# Patient Record
Sex: MALE | Race: WHITE | NOT HISPANIC OR LATINO | Employment: OTHER | URBAN - METROPOLITAN AREA
[De-identification: names, ages, dates, MRNs, and addresses within clinical notes are randomized per-mention and may not be internally consistent; named-entity substitution may affect disease eponyms.]

---

## 2017-10-19 ENCOUNTER — ALLIED HEALTH/NURSE VISIT (OUTPATIENT)
Dept: NURSING | Facility: CLINIC | Age: 67
End: 2017-10-19
Payer: COMMERCIAL

## 2017-10-19 DIAGNOSIS — Z23 NEED FOR TDAP VACCINATION: ICD-10-CM

## 2017-10-19 DIAGNOSIS — Z23 NEED FOR PROPHYLACTIC VACCINATION AND INOCULATION AGAINST INFLUENZA: Primary | ICD-10-CM

## 2017-10-19 PROCEDURE — 90715 TDAP VACCINE 7 YRS/> IM: CPT

## 2017-10-19 PROCEDURE — 90662 IIV NO PRSV INCREASED AG IM: CPT

## 2017-10-19 PROCEDURE — 90472 IMMUNIZATION ADMIN EACH ADD: CPT

## 2017-10-19 PROCEDURE — G0008 ADMIN INFLUENZA VIRUS VAC: HCPCS

## 2017-10-19 NOTE — PROGRESS NOTES

## 2017-10-20 ENCOUNTER — TRANSFERRED RECORDS (OUTPATIENT)
Dept: HEALTH INFORMATION MANAGEMENT | Facility: CLINIC | Age: 67
End: 2017-10-20

## 2018-03-21 ENCOUNTER — OFFICE VISIT (OUTPATIENT)
Dept: FAMILY MEDICINE | Facility: CLINIC | Age: 68
End: 2018-03-21
Payer: COMMERCIAL

## 2018-03-21 VITALS
TEMPERATURE: 98 F | HEART RATE: 87 BPM | WEIGHT: 184.9 LBS | OXYGEN SATURATION: 99 % | SYSTOLIC BLOOD PRESSURE: 122 MMHG | BODY MASS INDEX: 23.73 KG/M2 | DIASTOLIC BLOOD PRESSURE: 74 MMHG | HEIGHT: 74 IN

## 2018-03-21 DIAGNOSIS — Z00.00 MEDICARE ANNUAL WELLNESS VISIT, SUBSEQUENT: Primary | ICD-10-CM

## 2018-03-21 DIAGNOSIS — R47.89 WORD FINDING DIFFICULTY: ICD-10-CM

## 2018-03-21 DIAGNOSIS — G47.00 INSOMNIA, UNSPECIFIED TYPE: ICD-10-CM

## 2018-03-21 DIAGNOSIS — I73.9 PAD (PERIPHERAL ARTERY DISEASE) (H): ICD-10-CM

## 2018-03-21 DIAGNOSIS — L50.9 URTICARIA: ICD-10-CM

## 2018-03-21 DIAGNOSIS — Z87.442 HISTORY OF NEPHROLITHIASIS: ICD-10-CM

## 2018-03-21 DIAGNOSIS — I25.10 CORONARY ARTERY DISEASE INVOLVING NATIVE CORONARY ARTERY OF NATIVE HEART WITHOUT ANGINA PECTORIS: ICD-10-CM

## 2018-03-21 DIAGNOSIS — F17.200 SMOKING: ICD-10-CM

## 2018-03-21 PROCEDURE — 99213 OFFICE O/P EST LOW 20 MIN: CPT | Mod: 25 | Performed by: FAMILY MEDICINE

## 2018-03-21 PROCEDURE — 99397 PER PM REEVAL EST PAT 65+ YR: CPT | Performed by: FAMILY MEDICINE

## 2018-03-21 RX ORDER — DIPHENHYDRAMINE HCL 50 MG
50 CAPSULE ORAL EVERY 4 HOURS PRN
COMMUNITY
End: 2019-01-04

## 2018-03-21 RX ORDER — ASPIRIN 81 MG/1
81 TABLET ORAL DAILY
COMMUNITY
Start: 2017-10-22

## 2018-03-21 RX ORDER — SIMVASTATIN 40 MG
40 TABLET ORAL AT BEDTIME
COMMUNITY
Start: 2017-12-21

## 2018-03-21 RX ORDER — CLOPIDOGREL BISULFATE 75 MG/1
75 TABLET ORAL
COMMUNITY
Start: 2017-11-29 | End: 2018-12-28

## 2018-03-21 ASSESSMENT — ACTIVITIES OF DAILY LIVING (ADL)
I_NEED_ASSISTANCE_FOR_THE_FOLLOWING_DAILY_ACTIVITIES:: NO ASSISTANCE IS NEEDED
CURRENT_FUNCTION: NO ASSISTANCE NEEDED

## 2018-03-21 NOTE — NURSING NOTE
"Chief Complaint   Patient presents with     Medicare Visit     discuss 2 separate incidents of coginitive issue     /74  Pulse 87  Temp 98  F (36.7  C) (Oral)  Ht 6' 2\" (1.88 m)  Wt 184 lb 14.4 oz (83.9 kg)  SpO2 99%  BMI 23.74 kg/m2 Estimated body mass index is 23.74 kg/(m^2) as calculated from the following:    Height as of this encounter: 6' 2\" (1.88 m).    Weight as of this encounter: 184 lb 14.4 oz (83.9 kg).  Medication Reconciliation: complete      Health Maintenance due pending provider review:  NONE    n/a    Marti Zelaya CMA  "

## 2018-03-21 NOTE — PROGRESS NOTES
SUBJECTIVE:   Chepe Webster is a 68 year old male who presents for Preventive Visit.  Are you in the first 12 months of your Medicare coverage?  No    Physical   Annual:     Getting at least 3 servings of Calcium per day::  Yes    Bi-annual eye exam::  NO    Dental care twice a year::  Yes    Sleep apnea or symptoms of sleep apnea::  Daytime drowsiness    Diet::  Low fat/cholesterol and Other    Frequency of exercise::  6-7 days/week    Duration of exercise::  15-30 minutes    Taking medications regularly::  Yes    Medication side effects::  None    Ability to successfully perform activities of daily living: no assistance needed  Home Safety:  Lack of grab bars in the bathroom  Hearing Impairment: no hearing concerns    Retired HS teacher.    Concerns-  Trouble staying asleep- 5-6 years.  Never a problem getting to sleep.  Wakes in in middle of night, hard time getting back to sleep.  These tend to comes in batches- not every night.  Goes out and lies on the cough, then asleep within an hour.  Does have daytime sleepiness from the sleep issues.    H/o CAD- MI's x 3, has four stents.  [place]iology [place] [facility]  H/o PAD- R leg, has needed stenting x 4.  Last in 10/17.  Last interventional radiology appt in 2/18, rec 6mo f/u.    He is on simvastatin 40mg/d, plavix 75mg/d (lifelong), asa 81mg/d and toprol XL 12.5mg/d.  These are all through cardiology.    He also has a h/o chronic hives/urticaria.  Started many, many yrs ago, and has found that taking benadryl twice daily keeps the sx's at bay.  If he misses a dose, the sx's return.  No se's on it.    H/o Kidney stones x 2.  Passed it the first time, second time needed lithotripsy - 2 yrs ago.  Has annual urology f/u in August.    Exercise-   Walks wherever he goes- doesn't have a car.  Nightly- does 100 push ups and sit ups and butt crunches.    Concerns- couple episodes of 'odd words' coming out of his mouth, not thinking or meaning to say those things.  No  other neurological sx's.  At hospital, asked his birthday, and a random date came out.  Also about a month ago, while talking to a family member, a derogatory word came out - didn't mean or think to say it- very unusual for him.      Seb dermatitis.  Wondering if there are other options for it.    Patient Active Problem List    Diagnosis Date Noted     History of nephrolithiasis 03/22/2018     Priority: Medium     Two episodes, first passed, second required lithotripsy.  Follows w/ urology.       Primary insomnia 03/22/2018     Priority: Medium     Urticaria 03/21/2018     Priority: Medium     Hives many yrs ago, started benadryl twice daily.       PAD (peripheral artery disease) (H) 03/04/2016     Priority: Medium     10/17- 4th stent in R femoral artery.  On life-long plavix now.  2/18 interventional radiology consult- rec 6mo f/u.       Smoking 03/11/2013     Priority: Medium     less than one cigar a day,trying to quit       HYPERLIPIDEMIA LDL GOAL <100 10/31/2010     Priority: Medium     Coronary artery disease involving native coronary artery of native heart without angina pectoris 01/27/2004     Priority: Medium     Cardiology- [facility] [place], sees him yearly in July.  Dr. Martinez.  3 MI's in the past, has 4 stents.          Fall risk:  Fallen 2 or more times in the past year?: No  Any fall with injury in the past year?: No    COGNITIVE SCREEN  1) Repeat 3 items (Banana, Sunrise, Chair)    2) Clock draw: NORMAL  3) 3 item recall: Recalls 3 objects  Results: 3 items recalled: COGNITIVE IMPAIRMENT LESS LIKELY    Mini-CogTM Copyright SULTANA Cuello. Licensed by the author for use in [facility]; reprinted with permission (kyra@.[email or web address]). All rights reserved.        Reviewed and updated as needed this visit by clinical staff  Tobacco  Allergies  Meds  Med Hx  Surg Hx  Fam Hx  Soc Hx      Reviewed and updated as needed this visit by Provider        Social History   Substance Use Topics     Smoking status: Current  Every Day Smoker     Types: Cigars     Smokeless tobacco: Never Used     Alcohol use No       Alcohol Use 3/21/2018   If you drink alcohol do you typically have greater than 3 drinks per day OR greater than 7 drinks per week? Not Applicable   No flowsheet data found.    Used to be one cigar daily.  Down to 1-2/wk.    Today's PHQ-2 Score:   PHQ-2 ( 1999 Pfizer) 3/21/2018   Q1: Little interest or pleasure in doing things 0   Q2: Feeling down, depressed or hopeless 0   PHQ-2 Score 0   Q1: Little interest or pleasure in doing things Not at all   Q2: Feeling down, depressed or hopeless Not at all   PHQ-2 Score 0       Do you feel safe in your environment - YES    Do you have a Health Care Directive?: Yes: Patient states has Advance Directive and will bring in a copy to clinic.    Current providers sharing in care for this patient include:   Patient Care Team:  Amandeep De Leon MD as PCP - General    The following health maintenance items are reviewed in Epic and correct as of today:  Health Maintenance   Topic Date Due     HEPATITIS C SCREENING  03/12/1966     ADVANCE DIRECTIVE PLANNING Q5 YRS  03/12/2003     AORTIC ANEURYSM SCREENING (SYSTEM ASSIGNED)  03/12/2013     FALL RISK ASSESSMENT  03/04/2017     LIPID SCREEN Q5 YR MALE (SYSTEM ASSIGNED)  01/07/2018     INFLUENZA VACCINE (SYSTEM ASSIGNED)  09/01/2018     COLON CANCER SCREEN (SYSTEM ASSIGNED)  04/16/2022     TETANUS IMMUNIZATION (SYSTEM ASSIGNED)  10/19/2027     PNEUMOCOCCAL  Completed     Labs reviewed in EPIC  BP Readings from Last 3 Encounters:   03/21/18 122/74   03/04/16 120/80   03/31/15 100/60    Wt Readings from Last 3 Encounters:   03/21/18 184 lb 14.4 oz (83.9 kg)   03/04/16 179 lb 8 oz (81.4 kg)   03/31/15 182 lb (82.6 kg)                  Patient Active Problem List   Diagnosis     Coronary artery disease involving native coronary artery of native heart without angina pectoris     HYPERLIPIDEMIA LDL GOAL <100     Smoking     PAD (peripheral artery  "disease) (H)     Urticaria     History of nephrolithiasis     Primary insomnia     History reviewed. No pertinent surgical history.    Social History   Substance Use Topics     Smoking status: Current Every Day Smoker     Types: Cigars     Smokeless tobacco: Never Used     Alcohol use No     Family History   Problem Relation Age of Onset     Unknown/Adopted Mother      Unknown/Adopted Father      DIABETES No family hx of      Coronary Artery Disease No family hx of      Hypertension No family hx of      Hyperlipidemia No family hx of      CEREBROVASCULAR DISEASE No family hx of      Breast Cancer No family hx of      Colon Cancer No family hx of      Prostate Cancer No family hx of      Other Cancer No family hx of      Depression No family hx of      Anxiety Disorder No family hx of      MENTAL ILLNESS No family hx of      Substance Abuse No family hx of      Anesthesia Reaction No family hx of      Asthma No family hx of      OSTEOPOROSIS No family hx of      Genetic Disorder No family hx of      Thyroid Disease No family hx of      Obesity No family hx of          Current Outpatient Prescriptions   Medication Sig Dispense Refill     simvastatin (ZOCOR) 40 MG tablet Take 40 mg by mouth       clopidogrel (PLAVIX) 75 MG tablet Take 75 mg by mouth       aspirin EC 81 MG EC tablet Take 81 mg by mouth       metoprolol (TOPROL-XL) 25 MG 24 hr tablet Take 1 tablet by mouth daily. 1/2 pill daily 90 tablet prn     diphenhydrAMINE (BENADRYL) 50 MG capsule Take 50 mg by mouth       MAGNESIUM-POTASSIUM PO        Allergies   Allergen Reactions     No Known Allergies      Recent Labs   Lab Test 01/07/13  07/06/10   1050   LDL  90  101   HDL  52  42   TRIG  96  113   ALT   --   26        Review of Systems  Constitutional, HEENT, cardiovascular, pulmonary, gi and gu systems are negative, except as otherwise noted.    OBJECTIVE:   /74  Pulse 87  Temp 98  F (36.7  C) (Oral)  Ht 6' 2\" (1.88 m)  Wt 184 lb 14.4 oz (83.9 kg) " " SpO2 99%  BMI 23.74 kg/m2 Estimated body mass index is 23.74 kg/(m^2) as calculated from the following:    Height as of this encounter: 6' 2\" (1.88 m).    Weight as of this encounter: 184 lb 14.4 oz (83.9 kg).  Physical Exam  GENERAL APPEARANCE: healthy, alert and no distress     EYES: PERRL, sclera clear     HENT: nose and mouth without ulcers or lesions     NECK: no adenopathy, no asymmetry, masses, or scars and thyroid normal to palpation     RESP: lungs clear to auscultation - no rales, rhonchi or wheezes     CV: regular rates and rhythm, normal S1 S2, no S3 or S4 and no murmur, click or rub      Abdomen: soft, nontender, no HSM or masses and bowel sounds normal    Neuro: CN 2-12 grossly nl, fundi WNL bilaterally, UE and LE strength 5/5, nl sensation and DTR's.  Normal gait.      Psych: full range affect, normal speech and grooming, judgement and insight intact      Ext: warm, dry, no edema        ASSESSMENT / PLAN:       ICD-10-CM    1. Medicare annual wellness visit, subsequent Z00.00    2. PAD (peripheral artery disease) (H) I73.9    3. Coronary artery disease involving native coronary artery of native heart without angina pectoris I25.10    4. Urticaria L50.9    5. Smoking F17.200    6. History of nephrolithiasis Z87.442    7. Insomnia, unspecified type G47.00    8. Word finding difficulty R47.89      ---Wellness exam, pt doing well despite h/o CAD and PAD.    -Continues with regular f/u with cardiology, interventional radiology and urology.  Rx medications through cardiology.  Not fasting today- does labs through cardiology per pt.  -Urticaria- will cont benadryl BID- has been working for him for yrs.  -Smoking- no interest in stopping.  -Insomnia- no issues getting to sleep, has harder time staying asleep.  Recommended melatonin 1-6mg at night.  RTC if symptoms persist or worsen.   -Neurological sx's- random words 'popping out' infrequently.  No other neurological sx's. Pt will continue to monitor, and " "ask family members about it.  If continuing or worsening, rtc or call for neurology referral.    End of Life Planning:  Patient currently has an advanced directive: No.  I have verified the patient's ablity to prepare an advanced directive/make health care decisions.  Literature was provided to assist patient in preparing an advanced directive.    COUNSELING:  Reviewed preventive health counseling, as reflected in patient instructions    BP Screening:   Last 3 BP Readings:    BP Readings from Last 3 Encounters:   03/21/18 122/74   03/04/16 120/80   03/31/15 100/60       The following was recommended to the patient:  Re-screen BP within a year and recommended lifestyle modifications    Estimated body mass index is 23.74 kg/(m^2) as calculated from the following:    Height as of this encounter: 6' 2\" (1.88 m).    Weight as of this encounter: 184 lb 14.4 oz (83.9 kg).     reports that he has been smoking Cigars.  He has never used smokeless tobacco.  Tobacco Cessation Action Plan: Information offered: Patient not interested at this time    Appropriate preventive services were discussed with this patient, including applicable screening as appropriate for cardiovascular disease, diabetes, osteopenia/osteoporosis, and glaucoma.  As appropriate for age/gender, discussed screening for colorectal cancer, prostate cancer, breast cancer, and cervical cancer. Checklist reviewing preventive services available has been given to the patient.    Reviewed patients plan of care and provided an AVS. The Basic Care Plan (routine screening as documented in Health Maintenance) for [place] meets the Care Plan requirement. This Care Plan has been established and reviewed with the Patient.    Counseling Resources:  ATP IV Guidelines  Pooled Cohorts Equation Calculator  Breast Cancer Risk Calculator  FRAX Risk Assessment  ICSI Preventive Guidelines  Dietary Guidelines for Americans, 2010  USDA's MyPlate  ASA Prophylaxis  Lung CA " Screening    Petra Reynolds MD  [facility]

## 2018-03-21 NOTE — MR AVS SNAPSHOT
After Visit Summary   3/21/2018    Chepe Webster    MRN: [id number]           Patient Information     Date Of Birth          1950        Visit Information        Provider Department      3/21/2018 2:30 PM Petra Reynolds MD [facility]        Today's Diagnoses     Medicare annual wellness visit, subsequent    -  1    PAD (peripheral artery disease) (H)        Coronary artery disease involving native coronary artery of native heart without angina pectoris        Urticaria        Smoking          Care Instructions      Preventive Health Recommendations:       Male Ages 65 and over    Yearly exam:             See your health care provider every year in order to  o   Review health changes.   o   Discuss preventive care.    o   Review your medicines if your doctor has prescribed any.    Talk with your health care provider about whether you should have a test to screen for prostate cancer (PSA).    Every 3 years, have a diabetes test (fasting glucose). If you are at risk for diabetes, you should have this test more often.    Every 5 years, have a cholesterol test. Have this test more often if you are at risk for high cholesterol or heart disease.     Every 10 years, have a colonoscopy. Or, have a yearly FIT test (stool test). These exams will check for colon cancer.    Talk to with your health care provider about screening for Abdominal Aortic Aneurysm if you have a family history of AAA or have a history of smoking.  Shots:     Get a flu shot each year.     Get a tetanus shot every 10 years.     Talk to your doctor about your pneumonia vaccines. There are now two you should receive - Pneumovax (PPSV 23) and Prevnar (PCV 13).    Talk to your doctor about a shingles vaccine.     Talk to your doctor about the hepatitis B vaccine.  Nutrition:     Eat at least 5 servings of fruits and vegetables each day.     Eat whole-grain bread, whole-wheat pasta and brown rice instead of white  "grains and rice.     Talk to your doctor about Calcium and Vitamin D.   Lifestyle    Exercise for at least 150 minutes a week (30 minutes a day, 5 days a week). This will help you control your weight and prevent disease.     Limit alcohol to one drink per day.     No smoking.     Wear sunscreen to prevent skin cancer.     See your dentist every six months for an exam and cleaning.     See your eye doctor every 1 to 2 years to screen for conditions such as glaucoma, macular degeneration and cataracts.          Follow-ups after your visit        Who to contact     If you have questions or need follow up information about today's clinic visit or your schedule please contact [facility] directly at [phone].  Normal or non-critical lab and imaging results will be communicated to you by MyChart, letter or phone within 4 business days after the clinic has received the results. If you do not hear from us within 7 days, please contact the clinic through [employer] or phone. If you have a critical or abnormal lab result, we will notify you by phone as soon as possible.  Submit refill requests through [employer] or call your pharmacy and they will forward the refill request to us. Please allow 3 business days for your refill to be completed.          Additional Information About Your Visit        [employer] Information     [employer] lets you send messages to your doctor, view your test results, renew your prescriptions, schedule appointments and more. To sign up, go to [email or web address] . Click on \"Log in\" on the left side of the screen, which will take you to the Welcome page. Then click on \"Sign up Now\" on the right side of the page.     You will be asked to enter the access code listed below, as well as some personal information. Please follow the directions to create your username and password.     Your access code is: [email or web address]  Expires: 2018  3:21 PM     Your access code will  in 90 days. If you " "need help or a new code, please call your [facility] clinic or [phone].        Care EveryWhere ID     This is your Care EveryWhere ID. This could be used by other organizations to access your [facility] medical records  [id number]        Your Vitals Were     Pulse Temperature Height Pulse Oximetry BMI (Body Mass Index)       87 98  F (36.7  C) (Oral) 6' 2\" (1.88 m) 99% 23.74 kg/m2        Blood Pressure from Last 3 Encounters:   03/21/18 122/74   03/04/16 120/80   03/31/15 100/60    Weight from Last 3 Encounters:   03/21/18 184 lb 14.4 oz (83.9 kg)   03/04/16 179 lb 8 oz (81.4 kg)   03/31/15 182 lb (82.6 kg)              Today, you had the following     No orders found for display         Today's Medication Changes          These changes are accurate as of 3/21/18  3:21 PM.  If you have any questions, ask your nurse or doctor.               Stop taking these medicines if you haven't already. Please contact your care team if you have questions.     pravastatin 40 MG tablet   Commonly known as:  PRAVACHOL   Stopped by:  Petra Reynolds MD                    Primary Care Provider Office Phone # Fax #    Amandeep De Leon [phone] [address] [address]        Equal Access to Services     [place] : Hadbina Chavez, waaxda lujose miguel, qaybta kaalcatracho gutierrez. So [facility] [phone].    ATENCIÓN: Si habla español, tiene a collins disposición servicios gratuitos de asistencia lingüística. Milli xavier [phone].    We comply with applicable federal civil rights laws and [place] laws. We do not discriminate on the basis of race, color, national origin, age, disability, sex, sexual orientation, or gender identity.            Thank you!     Thank you for choosing [facility]  for your care. Our goal is always to provide you with excellent care. Hearing back from our patients is one way we can " continue to improve our services. Please take a few minutes to complete the written survey that you may receive in the mail after your visit with us. Thank you!             Your Updated Medication List - Protect others around you: Learn how to safely use, store and throw away your medicines at www.disposemymeds.org.          This list is accurate as of 3/21/18  3:21 PM.  Always use your most recent med list.                   Brand Name Dispense Instructions for use Diagnosis    aspirin EC 81 MG EC tablet      Take 81 mg by mouth        clopidogrel 75 MG tablet    PLAVIX     Take 75 mg by mouth        diphenhydrAMINE 50 MG capsule    BENADRYL     Take 50 mg by mouth        MAGNESIUM-POTASSIUM PO           metoprolol succinate 25 MG 24 hr tablet    TOPROL-XL    90 tablet    Take 1 tablet by mouth daily. 1/2 pill daily        simvastatin 40 MG tablet    ZOCOR     Take 40 mg by mouth

## 2018-03-21 NOTE — PROGRESS NOTES
SUBJECTIVE:   CC: Chepe Webster is an 68 year old male who presents for preventative health visit.     Physical   Annual:     Getting at least 3 servings of Calcium per day::  Yes    Bi-annual eye exam::  NO    Dental care twice a year::  Yes    Sleep apnea or symptoms of sleep apnea::  Daytime drowsiness    Diet::  Low fat/cholesterol and Other    Frequency of exercise::  6-7 days/week    Duration of exercise::  15-30 minutes    Taking medications regularly::  Yes    Medication side effects::  None    Ability to successfully perform activities of daily living: no assistance needed  Home Safety:  Lack of grab bars in the bathroom  Hearing Impairment: no hearing concerns    {Outside tests to abstract? :249753}    {additional problems to add (Optional):379775}    Today's PHQ-2 Score:   PHQ-2 ( 1999 Pfizer) 3/21/2018   Q1: Little interest or pleasure in doing things 0   Q2: Feeling down, depressed or hopeless 0   PHQ-2 Score 0   Q1: Little interest or pleasure in doing things Not at all   Q2: Feeling down, depressed or hopeless Not at all   PHQ-2 Score 0       Abuse: Current or Past(Physical, Sexual or Emotional)- {YES/NO/NA:017937}  Do you feel safe in your environment - {YES/NO/NA:415992}    Social History   Substance Use Topics     Smoking status: Current Every Day Smoker     Types: Cigars     Smokeless tobacco: Never Used     Alcohol use No     Alcohol Use 3/21/2018   If you drink alcohol do you typically have greater than 3 drinks per day OR greater than 7 drinks per week? Not Applicable   {add AUDIT responses (Optional) (A score of 7 for adult men is an indication of hazardous drinking; a score of 8 or more is an indication of an alcohol use disorder.  A score of 7 or more for adult women is an indication of hazardous drinking or an alchohol use disorder):356217}    Last PSA: No results found for: PSA    Reviewed orders with patient. Reviewed health maintenance and updated orders accordingly -  "{Yes/No:857376::\"Yes\"}  {Chronicprobdata (Optional):494509}    Reviewed and updated as needed this visit by clinical staff  Allergies  Meds         Reviewed and updated as needed this visit by Provider        {HISTORY OPTIONS (Optional):874824}    Review of Systems  {MALE ROS-adult preventive care package:959214::\"C: NEGATIVE for fever, chills, change in weight\",\"I: NEGATIVE for worrisome rashes, moles or lesions\",\"E: NEGATIVE for vision changes or irritation\",\"ENT: NEGATIVE for ear, mouth and throat problems\",\"R: NEGATIVE for significant cough or SOB\",\"CV: NEGATIVE for chest pain, palpitations or peripheral edema\",\"GI: NEGATIVE for nausea, abdominal pain, heartburn, or change in bowel habits\",\" male: negative for dysuria, hematuria, decreased urinary stream, erectile dysfunction, urethral discharge\",\"M: NEGATIVE for significant arthralgias or myalgia\",\"N: NEGATIVE for weakness, dizziness or paresthesias\",\"P: NEGATIVE for changes in mood or affect\"}    OBJECTIVE:   There were no vitals taken for this visit.    Physical Exam  {Exam Choices:137910}    ASSESSMENT/PLAN:   {Diag Picklist:526124}    COUNSELING:   {MALE COUNSELING MESSAGES:028393::\"Reviewed preventive health counseling, as reflected in patient instructions\"}    {BP Counseling- Complete if BP >= 120/80  (Optional):664715}     reports that he has been smoking Cigars.  He has never used smokeless tobacco.  {Tobacco Cessation -- Complete if patient is a smoker (Optional):998405}  Estimated body mass index is 23.05 kg/(m^2) as calculated from the following:    Height as of 3/4/16: 6' 2\" (1.88 m).    Weight as of 3/4/16: 179 lb 8 oz (81.4 kg).   {Weight Management Plan (ACO) Complete if BMI is abnormal-  Ages 18-64  BMI >24.9.  Age 65+ with BMI <23 or >30 (Optional):778197}    Counseling Resources:  ATP IV Guidelines  Pooled Cohorts Equation Calculator  FRAX Risk Assessment  ICSI Preventive Guidelines  Dietary Guidelines for Americans, 2010  USDA's " MyPlate  ASA Prophylaxis  Lung CA Screening    Petra Reynolds MD  [facility]

## 2018-03-22 PROBLEM — Z87.442 HISTORY OF NEPHROLITHIASIS: Status: ACTIVE | Noted: 2018-03-22

## 2018-03-22 PROBLEM — F51.01 PRIMARY INSOMNIA: Status: ACTIVE | Noted: 2018-03-22

## 2018-04-09 ENCOUNTER — TRANSFERRED RECORDS (OUTPATIENT)
Dept: HEALTH INFORMATION MANAGEMENT | Facility: CLINIC | Age: 68
End: 2018-04-09

## 2018-12-04 ENCOUNTER — OFFICE VISIT (OUTPATIENT)
Dept: FAMILY MEDICINE | Facility: CLINIC | Age: 68
End: 2018-12-04
Payer: COMMERCIAL

## 2018-12-04 VITALS
HEART RATE: 63 BPM | DIASTOLIC BLOOD PRESSURE: 76 MMHG | OXYGEN SATURATION: 98 % | HEIGHT: 74 IN | WEIGHT: 182.6 LBS | TEMPERATURE: 97.6 F | SYSTOLIC BLOOD PRESSURE: 123 MMHG | BODY MASS INDEX: 23.44 KG/M2

## 2018-12-04 DIAGNOSIS — I25.10 CORONARY ARTERY DISEASE INVOLVING NATIVE CORONARY ARTERY OF NATIVE HEART WITHOUT ANGINA PECTORIS: ICD-10-CM

## 2018-12-04 DIAGNOSIS — F17.200 SMOKING: ICD-10-CM

## 2018-12-04 DIAGNOSIS — E78.5 HYPERLIPIDEMIA LDL GOAL <100: ICD-10-CM

## 2018-12-04 DIAGNOSIS — Z01.818 PREOP GENERAL PHYSICAL EXAM: Primary | ICD-10-CM

## 2018-12-04 DIAGNOSIS — I73.9 PAD (PERIPHERAL ARTERY DISEASE) (H): ICD-10-CM

## 2018-12-04 LAB — HGB BLD-MCNC: 10.5 G/DL (ref 13.3–17.7)

## 2018-12-04 PROCEDURE — 85018 HEMOGLOBIN: CPT | Performed by: FAMILY MEDICINE

## 2018-12-04 PROCEDURE — 99215 OFFICE O/P EST HI 40 MIN: CPT | Performed by: FAMILY MEDICINE

## 2018-12-04 PROCEDURE — 80048 BASIC METABOLIC PNL TOTAL CA: CPT | Performed by: FAMILY MEDICINE

## 2018-12-04 PROCEDURE — 99207 ZZC FOR CODING REVIEW: CPT | Performed by: FAMILY MEDICINE

## 2018-12-04 PROCEDURE — 93000 ELECTROCARDIOGRAM COMPLETE: CPT | Performed by: FAMILY MEDICINE

## 2018-12-04 PROCEDURE — 36415 COLL VENOUS BLD VENIPUNCTURE: CPT | Performed by: FAMILY MEDICINE

## 2018-12-04 RX ORDER — METOPROLOL TARTRATE 25 MG/1
12.5 TABLET, FILM COATED ORAL 2 TIMES DAILY
COMMUNITY
Start: 2018-07-18

## 2018-12-04 NOTE — MR AVS SNAPSHOT
After Visit Summary   12/4/2018    Chepe Webster    MRN: [id number]           Patient Information     Date Of Birth          1950        Visit Information        Provider Department      12/4/2018 10:30 AM Petra Reynolds MD [facility]        Today's Diagnoses     Preop general physical exam    -  1    PAD (peripheral artery disease) (H)        Coronary artery disease involving native coronary artery of native heart without angina pectoris        Hyperlipidemia LDL goal <100        Smoking          Care Instructions      Before Your Surgery      Call your surgeon if there is any change in your health. This includes signs of a cold or flu (such as a sore throat, runny nose, cough, rash or fever).    Do not smoke, drink alcohol or take over the counter medicine (unless your surgeon or primary care doctor tells you to) for the 24 hours before and after surgery.    If you take prescribed drugs: Follow your doctor s orders about which medicines to take and which to stop until after surgery.    Eating and drinking prior to surgery: follow the instructions from your surgeon    Take a shower or bath the night before surgery. Use the soap your surgeon gave you to gently clean your skin. If you do not have soap from your surgeon, use your regular soap. Do not shave or scrub the surgery site.  Wear clean pajamas and have clean sheets on your bed.           Follow-ups after your visit        Who to contact     If you have questions or need follow up information about today's clinic visit or your schedule please contact [facility] directly at [phone].  Normal or non-critical lab and imaging results will be communicated to you by MyChart, letter or phone within 4 business days after the clinic has received the results. If you do not hear from us within 7 days, please contact the clinic through MyChart or phone. If you have a critical or abnormal lab result, we will  "notify you by phone as soon as possible.  Submit refill requests through [employer] or call your pharmacy and they will forward the refill request to us. Please allow 3 business days for your refill to be completed.          Additional Information About Your Visit        Care EveryWhere ID     This is your Care EveryWhere ID. This could be used by other organizations to access your [place] medical records  [id number]        Your Vitals Were     Pulse Temperature Height Pulse Oximetry BMI (Body Mass Index)       63 97.6  F (36.4  C) (Oral) 6' 2\" (1.88 m) 98% 23.44 kg/m2        Blood Pressure from Last 3 Encounters:   12/04/18 123/76   03/21/18 122/74   03/04/16 120/80    Weight from Last 3 Encounters:   12/04/18 182 lb 9.6 oz (82.8 kg)   03/21/18 184 lb 14.4 oz (83.9 kg)   03/04/16 179 lb 8 oz (81.4 kg)              We Performed the Following     Basic metabolic panel  (Ca, Cl, CO2, Creat, Gluc, K, Na, BUN)     EKG 12-lead complete w/read - Clinics     Hemoglobin        Primary Care Provider Office Phone # Fax #    Amandeep De Leon [phone]       [facility] [address]        Equal Access to Services     MARCO ANTONIO HIRSCH AH: Hadii lexy lira hadasho Soomaali, waaxda luqadaha, qaybta kaalmada adeegyada, catracho cortes . So [facility] [phone].    ATENCIÓN: Si habla español, tiene a collins disposición servicios gratuitos de asistencia lingüística. Llame al [phone].    We comply with applicable federal civil rights laws and [place] laws. We do not discriminate on the basis of race, color, national origin, age, disability, sex, sexual orientation, or gender identity.            Thank you!     Thank you for choosing [facility]  for your care. Our goal is always to provide you with excellent care. Hearing back from our patients is one way we can continue to improve our services. Please take a few minutes to complete the written survey that you may " receive in the mail after your visit with us. Thank you!             Your Updated Medication List - Protect others around you: Learn how to safely use, store and throw away your medicines at www.disposemymeds.org.          This list is accurate as of 12/4/18 12:10 PM.  Always use your most recent med list.                   Brand Name Dispense Instructions for use Diagnosis    aspirin 81 MG EC tablet      Take 81 mg by mouth        clopidogrel 75 MG tablet    PLAVIX     Take 75 mg by mouth        diphenhydrAMINE 50 MG capsule    BENADRYL     Take 50 mg by mouth        MAGNESIUM-POTASSIUM PO           metoprolol tartrate 25 MG tablet    LOPRESSOR     Take 12.5 mg by mouth 2 times daily        simvastatin 40 MG tablet    ZOCOR     Take 40 mg by mouth

## 2018-12-04 NOTE — PROGRESS NOTES
[facility]  [address] [place]  [facility] [address] [phone]  Dept: [phone]    PRE-OP EVALUATION:  Today's date: 2018    Chepe Webster (: 1950) presents for pre-operative evaluation assessment as requested by Dr. Amandeep Hall.  He requires evaluation and anesthesia risk assessment prior to undergoing surgery/procedure for treatment of femoral artery bypass.    Fax number for surgical facility: [phone]  Primary Physician: Amandeep De Leon  Type of Anesthesia Anticipated: General    Patient has a Health Care Directive or Living Will:  YES     Preop Questions 2018   Who is doing your surgery? Dr Amandeep Hall    What are you having done? bypass of femoral artery   Date of Surgery/Procedure:    Facility or Hospital where procedure/surgery [place] be [place] [facility] [place]         HPI:       1. YES - DO YOU HAVE A HISTORY OF HEART ATTACK, STROKE, STENT, BYPASS OR SURGERY ON AN ARTERY IN THE HEAD, NECK, HEART OR LEG? Three heart attacks and four stents placed in his heart.  2. YES - DO YOU EVER HAVE ANY PAIN OR DISCOMFORT IN YOUR CHEST? Not now  3. NO - Do you have a history of  Heart Failure?  4. NO - Are you troubled by shortness of breath when: walking on the level, up a slight hill or at night?  5. NO - Do you currently have a cold, bronchitis or other respiratory infection?  6. NO - Do you have a cough, shortness of breath or wheezing?  7. YES - DO YOU SOMETIMES GET PAINS IN THE CALVES OF YOUR LEGS WHEN YOU WALK? Reason for surgery  8. YES - DO YOU OR ANYONE IN YOUR FAMILY HAVE PREVIOUS HISTORY OF BLOOD CLOTS? No DVTs or PEs in self or family  9. NO - Do you or does anyone in your family have a serious bleeding problem such as prolonged bleeding following surgeries or cuts?  10. NO - Have you ever had problems with anemia or been told to take iron pills?  11. NO - Have you had any abnormal blood loss such as black, tarry or bloody stools, or abnormal vaginal  bleeding?  12. YES - Have you ever had a blood transfusion? He had a blood transfusion 9/18/18 s/p femoral stenting procedure.  13. NO - HAVE YOU OR ANY OF YOUR RELATIVES EVER HAD PROBLEMS WITH ANESTHESIA?   14. NO - Do you have sleep apnea, excessive snoring or daytime drowsiness?  15. NO - Do you have any prosthetic heart valves?  16. NO - Do you have prosthetic joints?  17. NO - Is there any chance that you may be pregnant?no      HPI related to upcoming procedure:   3 1/2 yrs ago, he started getting pain in his right calf when he walked.  Has had a stent placed 5 times, but keeps clotting off.  Sx's have been back this time since 10/29/18.  Helping his brother with his garden, and it suddenly started hurting.  Has hurt since then, has to sleep on the couch with his leg down.  Now hurts with walking (after a block) or if he sleeps with it elevated.  He's also been getting mild swelling in his R leg.    Last few days, he's had a pain in his R lateral thigh.  Started Sat.  No rash.  Has had shingles before, on his scalp.  Feels it may be from sleeping funny on the couch.  No other back pain or injury.    See problem list for active medical problems.  Problems all longstanding and stable, except as noted/documented.  See ROS for pertinent symptoms related to these conditions.                                                                                                                                                          .    MEDICAL HISTORY:     Patient Active Problem List    Diagnosis Date Noted     History of nephrolithiasis 03/22/2018     Priority: Medium     Two episodes, first passed, second required lithotripsy.  Follows w/ urology.       Primary insomnia 03/22/2018     Priority: Medium     Urticaria 03/21/2018     Priority: Medium     Hives many yrs ago, started benadryl twice daily.       PAD (peripheral artery disease) (H) 03/04/2016     Priority: Medium     PAD of R leg.  S/p multiple stents, lysis  procedures and re-occlusions.  Hypercoagulable w/u negative in '18.   Planning fem-pop bypass in 12/18.  On long-term plavix and asa 81mg/d through vascular/cardiology.    Per '18 consultation with Dr. Hall-   67 yo WM with history of PAD, s/p stenting with multiple episodes of thrombosis and lysis. Due to multiple episodes of thrombosis with no obvious culprit lesions after lysis, hypercoagulable work up initiated which showed ATIII, Pru plt inhibition, Prt C, prt S, DRVVT, Lupus Anticoag all normal or negative.         Smoking 03/11/2013     Priority: Medium     less than one cigar a day,trying to quit  12/18- down to two cigars/wk       Hyperlipidemia LDL goal <100 10/31/2010     Priority: Medium     Simvatatin 40mg/d       Coronary artery disease involving native coronary artery of native heart without angina pectoris 01/27/2004     Priority: Medium     Cardiology- [facility] [place], follows yearly in July with Dr. Martinez.  H/o 3 MI's in the past, has 4 stents.        Past Medical History:   Diagnosis Date     Other specified urticaria      Pure hypercholesterolemia      Tobacco use disorder      Past Surgical History:   Procedure Laterality Date     AS REVASCULARIZE FEM/POP ARTERY, ANGIOPLASTY/STENT      multiple in '15 and '17     CYSTOSCOPY      multiple      HC URETERAL STENT REPOSITIONING Left 02/26/2016     Current Outpatient Prescriptions   Medication Sig Dispense Refill     aspirin EC 81 MG EC tablet Take 81 mg by mouth       clopidogrel (PLAVIX) 75 MG tablet Take 75 mg by mouth       diphenhydrAMINE (BENADRYL) 50 MG capsule Take 50 mg by mouth       MAGNESIUM-POTASSIUM PO        metoprolol tartrate (LOPRESSOR) 25 MG tablet Take 12.5 mg by mouth 2 times daily       simvastatin (ZOCOR) 40 MG tablet Take 40 mg by mouth       OTC products: None, except as noted above    Allergies   Allergen Reactions     No Known Allergies       Latex Allergy: NO    Social History   Substance Use Topics     Smoking status: Current Some  "Day Smoker     Types: Cigars     Smokeless tobacco: Never Used      Comment: 12/18- down to cigars twice a week     Alcohol use No     History   Drug Use No       REVIEW OF SYSTEMS:   CONSTITUTIONAL: NEGATIVE for fever, chills, change in weight  INTEGUMENTARY/SKIN: NEGATIVE for worrisome rashes, moles or lesions  EYES: NEGATIVE for vision changes or irritation  ENT/MOUTH: NEGATIVE for ear, mouth and throat problems  RESP: NEGATIVE for significant cough or SOB  CV: NEGATIVE for chest pain, palpitations or peripheral edema other than R foot/ankle swelling  GI: NEGATIVE for nausea, abdominal pain, heartburn, or change in bowel habits  : NEGATIVE for frequency, dysuria, or hematuria  MUSCULOSKELETAL: NEGATIVE for significant arthralgias or myalgia  NEURO: NEGATIVE for weakness, dizziness or paresthesias other than mild R lateral leg tingling and R foot numbness  ENDOCRINE: NEGATIVE for temperature intolerance, skin/hair changes  HEME: NEGATIVE for bleeding problems  PSYCHIATRIC: NEGATIVE for changes in mood or affect    EXAM:   /76 (BP Location: Left arm, Cuff Size: Adult Large)  Pulse 63  Temp 97.6  F (36.4  C) (Oral)  Ht 6' 2\" (1.88 m)  Wt 182 lb 9.6 oz (82.8 kg)  SpO2 98%  BMI 23.44 kg/m2    GENERAL APPEARANCE: healthy, alert and no distress     EYES: EOMI,  PERRL     HENT: ear canals and TM's normal and nose and mouth without ulcers or lesions     NECK: no adenopathy, no asymmetry, masses, or scars and thyroid normal to palpation     RESP: lungs clear to auscultation - no rales, rhonchi or wheezes     CV: regular rates and rhythm, normal S1 S2, no S3 or S4 and no murmur, click or rub     ABDOMEN:  soft, nontender, no HSM or masses and bowel sounds normal     MS: extremities normal- no gross deformities noted, no evidence of inflammation in joints, FROM in all extremities.     SKIN: no suspicious lesions or rashes     NEURO: Normal strength and tone, sensory exam grossly normal, mentation intact and " speech normal     PSYCH: mentation appears normal. and affect normal/bright     LYMPHATICS: No cervical adenopathy    DIAGNOSTICS:     EKG: appears normal, NSR, normal axis, normal intervals, no acute ST/T changes c/w ischemia, no LVH by voltage criteria, unchanged from previous tracings      Labs Resulted Today:   Results for orders placed or performed in visit on 12/04/18   Hemoglobin   Result Value Ref Range    Hemoglobin 10.5 (L) 13.3 - 17.7 g/dL     Up from 9.7 on 9/22/18.    Labs Drawn and in Process:   Unresulted Labs Ordered in the Past 30 Days of this Admission     Date and Time Order Name Status Description    12/4/2018 1128 BASIC METABOLIC PANEL In process         IMPRESSION:   Reason for surgery/procedure: R leg pain due to peripheral artery disease, failed multiple stents to area  Diagnosis/reason for consult: evaluate for anesthesia    The proposed surgical procedure is considered INTERMEDIATE risk.    REVISED CARDIAC RISK INDEX  The patient has the following serious cardiovascular risks for perioperative complications such as (MI, PE, VFib and 3  AV Block):  High risk surgery (>5% cardiac complication risk)  INTERPRETATION: 2 risks: Class III (moderate risk - 6.6% complication rate)    The patient has the following additional risks for perioperative complications:  Significant bleeding history      ICD-10-CM    1. Preop general physical exam Z01.818 Hemoglobin     EKG 12-lead complete w/read - Clinics     Basic metabolic panel  (Ca, Cl, CO2, Creat, Gluc, K, Na, BUN)   2. PAD (peripheral artery disease) (H) I73.9 Hemoglobin     EKG 12-lead complete w/read - Clinics     Basic metabolic panel  (Ca, Cl, CO2, Creat, Gluc, K, Na, BUN)   3. Coronary artery disease involving native coronary artery of native heart without angina pectoris I25.10 Hemoglobin     EKG 12-lead complete w/read - Clinics     Basic metabolic panel  (Ca, Cl, CO2, Creat, Gluc, K, Na, BUN)   4. Hyperlipidemia LDL goal <100 E78.5    5.  Smoking F17.200        RECOMMENDATIONS:     --Consult hospital rounder / IM to assist post-op medical management    Cardiovascular Risk  Patient is already on a Beta Blocker. Continue Betablocker therapy after surgery, using Beta blocker order set as necessary for NPO status.      Anemia  Anemia and does not require treatment prior to surgery other than will start taking oral iron one tab every other day.  Monitor Hemoglobin postoperatively.      --Patient is to take all scheduled medications on the day of surgery EXCEPT for modifications listed below.    Anticoagulant or Antiplatelet Medication Use  ASPIRIN: per vascular surgeon - patient will call to discuss with them  PLAVIX: per vascular surgeon - patient will call to discuss with them       APPROVAL GIVEN to proceed with proposed procedure, without further diagnostic evaluation       Signed Electronically by: Petra Reynolds MD    Copy of this evaluation report is provided to requesting physician.    [facility] Preop Guidelines    Revised Cardiac Risk Index

## 2018-12-05 LAB
ANION GAP SERPL CALCULATED.3IONS-SCNC: 10 MMOL/L (ref 3–14)
BUN SERPL-MCNC: 17 MG/DL (ref 7–30)
CALCIUM SERPL-MCNC: 9.4 MG/DL (ref 8.5–10.1)
CHLORIDE SERPL-SCNC: 104 MMOL/L (ref 94–109)
CO2 SERPL-SCNC: 23 MMOL/L (ref 20–32)
CREAT SERPL-MCNC: 0.99 MG/DL (ref 0.66–1.25)
GFR SERPL CREATININE-BSD FRML MDRD: 74 ML/MIN/1.7M2
GLUCOSE SERPL-MCNC: 96 MG/DL (ref 70–99)
POTASSIUM SERPL-SCNC: 4.3 MMOL/L (ref 3.4–5.3)
SODIUM SERPL-SCNC: 137 MMOL/L (ref 133–144)

## 2018-12-06 NOTE — PROGRESS NOTES
Please send letter below with copy of results.  Thanks! CW    Here are your lab results from your recent visit...  -Your basic metabolic panel (which includes electrolyte levels, blood sugar level and kidney function tests) looks normal.  -Your hemoglobin is a bit low as we discussed.  I would continue to take the iron every other day after surgery, and we should have you come back in for a recheck with a doctors appointment and hemoglobin recheck at the office in about two months.      Please let me know if you have any questions.  Best,   Gabriel Reynolds MD

## 2018-12-28 ENCOUNTER — NURSING HOME VISIT (OUTPATIENT)
Dept: GERIATRICS | Facility: CLINIC | Age: 68
End: 2018-12-28
Payer: COMMERCIAL

## 2018-12-28 ENCOUNTER — TELEPHONE (OUTPATIENT)
Dept: OTHER | Facility: CLINIC | Age: 68
End: 2018-12-28

## 2018-12-28 VITALS
WEIGHT: 194.4 LBS | RESPIRATION RATE: 18 BRPM | SYSTOLIC BLOOD PRESSURE: 101 MMHG | HEIGHT: 73 IN | OXYGEN SATURATION: 95 % | BODY MASS INDEX: 25.76 KG/M2 | HEART RATE: 73 BPM | TEMPERATURE: 98.8 F | DIASTOLIC BLOOD PRESSURE: 61 MMHG

## 2018-12-28 DIAGNOSIS — F51.01 PRIMARY INSOMNIA: ICD-10-CM

## 2018-12-28 DIAGNOSIS — I82.411 ACUTE DEEP VEIN THROMBOSIS (DVT) OF FEMORAL VEIN OF RIGHT LOWER EXTREMITY (H): ICD-10-CM

## 2018-12-28 DIAGNOSIS — K59.09 CHRONIC CONSTIPATION: ICD-10-CM

## 2018-12-28 DIAGNOSIS — E78.5 HYPERLIPIDEMIA LDL GOAL <100: ICD-10-CM

## 2018-12-28 DIAGNOSIS — R33.9 URINARY RETENTION: ICD-10-CM

## 2018-12-28 DIAGNOSIS — I25.10 CORONARY ARTERY DISEASE INVOLVING NATIVE CORONARY ARTERY OF NATIVE HEART WITHOUT ANGINA PECTORIS: ICD-10-CM

## 2018-12-28 DIAGNOSIS — Z51.81 ANTICOAGULATION MANAGEMENT ENCOUNTER: ICD-10-CM

## 2018-12-28 DIAGNOSIS — Z95.828 S/P FEMORAL-POPLITEAL BYPASS SURGERY: Primary | ICD-10-CM

## 2018-12-28 DIAGNOSIS — Z87.442 HISTORY OF NEPHROLITHIASIS: ICD-10-CM

## 2018-12-28 DIAGNOSIS — I73.9 PAD (PERIPHERAL ARTERY DISEASE) (H): ICD-10-CM

## 2018-12-28 DIAGNOSIS — I10 ESSENTIAL HYPERTENSION: ICD-10-CM

## 2018-12-28 DIAGNOSIS — N50.89 SCROTAL EDEMA: ICD-10-CM

## 2018-12-28 DIAGNOSIS — D62 ANEMIA DUE TO BLOOD LOSS, ACUTE: ICD-10-CM

## 2018-12-28 DIAGNOSIS — Z79.01 ANTICOAGULATION MANAGEMENT ENCOUNTER: ICD-10-CM

## 2018-12-28 PROCEDURE — 99310 SBSQ NF CARE HIGH MDM 45: CPT | Performed by: NURSE PRACTITIONER

## 2018-12-28 RX ORDER — BISACODYL 10 MG
10 SUPPOSITORY, RECTAL RECTAL DAILY PRN
COMMUNITY
End: 2019-01-04

## 2018-12-28 RX ORDER — ACETAMINOPHEN 325 MG/1
650 TABLET ORAL EVERY 4 HOURS PRN
COMMUNITY
End: 2019-01-04

## 2018-12-28 RX ORDER — AMOXICILLIN 250 MG
1 CAPSULE ORAL 2 TIMES DAILY
COMMUNITY
End: 2019-01-04

## 2018-12-28 RX ORDER — WARFARIN SODIUM 4 MG/1
TABLET ORAL DAILY
COMMUNITY
End: 2019-01-10

## 2018-12-28 RX ORDER — ONDANSETRON 4 MG/1
4 TABLET, FILM COATED ORAL EVERY 6 HOURS PRN
COMMUNITY
End: 2019-01-04

## 2018-12-28 RX ORDER — TAMSULOSIN HYDROCHLORIDE 0.4 MG/1
0.4 CAPSULE ORAL DAILY
COMMUNITY
End: 2019-01-04

## 2018-12-28 RX ORDER — CALCIUM CARBONATE 500 MG/1
1 TABLET, CHEWABLE ORAL 3 TIMES DAILY PRN
COMMUNITY
End: 2020-01-21

## 2018-12-28 RX ORDER — TRAZODONE HYDROCHLORIDE 50 MG/1
50 TABLET, FILM COATED ORAL
COMMUNITY
End: 2019-01-04

## 2018-12-28 RX ORDER — OXYCODONE HYDROCHLORIDE 5 MG/1
5-10 TABLET ORAL EVERY 4 HOURS PRN
COMMUNITY
End: 2019-01-04

## 2018-12-28 RX ORDER — LANOLIN ALCOHOL/MO/W.PET/CERES
3 CREAM (GRAM) TOPICAL
COMMUNITY
End: 2019-01-04

## 2018-12-28 RX ORDER — POLYETHYLENE GLYCOL 3350 17 G/17G
17 POWDER, FOR SOLUTION ORAL 2 TIMES DAILY
COMMUNITY
End: 2019-01-04

## 2018-12-28 ASSESSMENT — MIFFLIN-ST. JEOR: SCORE: 1695.67

## 2018-12-28 NOTE — PROGRESS NOTES
[facility]  PRIMARY CARE PROVIDER AND CLINIC:  Amandeep De Leon [address] [facility]  [address] / [facility] [address]  Chief Complaint   Patient presents with     Hospital F/U     [place] Medical Record Number:  [id number]  Place of Service where encounter took place:  [facility] [[id number]]    HPI:    Chepe Webster is a 68 year old  (1950),admitted to the above facility from  [facility].  Hospital stay 12/17/2018 through 12/27/2018.  Admitted to this facility for  rehab, medical management and nursing care.  HPI information obtained from: facility chart records, facility staff, patient report and Care Everywhere Epic chart review.  Current issues are:      S/P femoral-popliteal bypass surgery  PAD (peripheral artery disease) (H)- h/o multiple stents in right mid to distal SFA.   Patient transferred to TCU from hospital after treatment for RLE claudication. On 12/17 he underwent a right common femoral endarterectomy, right popliteal artery endarterectomy and femoral to popliteal bypass with in situ saphenous vein graft. Post op issues include acute DVT of right femoral vein, urinary retention, scrotal swelling, and acute blood loss anemia.   Today he has very little pain. He is up and walking with a walker. He hopes to return home next Thursday.     Acute deep vein thrombosis (DVT) of femoral vein of right lower extremity (H)  Anticoagulation management encounter  Patient found to have acute DVT of right femoral vein on post op day two. He was started on heparin and warfarin on 12/20. Patient was offered suction thrombectomy by IR but he declined.   INR goal 2-3 and should continue warfarin for 3-6 mos.   INR today 1.5   ABLA- hgb dropped to 7.0 from 9.4.on POD#3. He was transfused one unit PRBC. hgb was 12.3 in November 2016. No record of iron studies  Coronary artery disease involving native coronary artery of native heart without angina pectoris  Hyperlipidemia LDL goal  <100  Hypertension  BP's: 101/61, 127/68, 131/68  s/p PCI to RCA with BMS on 12/30/12.   He continues on metoprolol, simvastatin.     History of nephrolithiasis- bilateral.   Urinary retention  Scrotal edema  Urology was consulted during this hospitalization due to urinary retention. flomax was started. Prior to transfer to TCU he was able to empty his bladder. He did develop scrotal edema during hospital stay.     Primary insomnia  Takes trazodone. Melatonin    chronic constipation- patient reports for past 30 years he has bowel movement every 4 days. He did have BM yesterday and today. Recommendation made for colonoscopy due to fecal occult pos for blood.         CODE STATUS/ADVANCE DIRECTIVES DISCUSSION:   CPR/Full code   Patient's living condition: lives alone in four plex    ALLERGIES:No known allergies  PAST MEDICAL HISTORY:  has a past medical history of Other specified urticaria, Pure hypercholesterolemia, and Tobacco use disorder.  PAST SURGICAL HISTORY:  has a past surgical history that includes cystoscopy; hc ureteral stent repositioning (Left, 02/26/2016); and as revascularize fem/pop artery, angioplasty/stent.  FAMILY HISTORY: family history includes Unknown/Adopted in his father and mother.  SOCIAL HISTORY:  reports that he has been smoking cigars.  he has never used smokeless tobacco. He reports that he does not drink alcohol or use drugs.    Post Discharge Medication Reconciliation Status: discharge medications reconciled, continue medications without change.  Current Outpatient Medications   Medication Sig Dispense Refill     acetaminophen (TYLENOL) 325 MG tablet Take 650 mg by mouth every 4 hours as needed for mild pain       aspirin EC 81 MG EC tablet Take 81 mg by mouth daily        bisacodyl (DULCOLAX) 10 MG suppository Place 10 mg rectally daily as needed for constipation       calcium carbonate (TUMS) 500 MG chewable tablet Take 1 chew tab by mouth 3 times daily as needed for heartburn        "diphenhydrAMINE (BENADRYL) 50 MG capsule Take 50 mg by mouth every 4 hours as needed        enoxaparin (LOVENOX) 80 MG/0.8ML syringe Inject 80 mg Subcutaneous every 12 hours       melatonin 3 MG tablet Take 1 mg by mouth nightly as needed for sleep For 14 days       metoprolol tartrate (LOPRESSOR) 25 MG tablet Take 12.5 mg by mouth 2 times daily       ondansetron (ZOFRAN) 4 MG tablet Take 4 mg by mouth every 6 hours as needed for nausea       oxyCODONE (ROXICODONE) 5 MG tablet Take 5-10 mg by mouth every 4 hours as needed for severe pain       polyethylene glycol (MIRALAX/GLYCOLAX) packet Take 1 packet by mouth 2 times daily       senna-docusate (SENOKOT-S/PERICOLACE) 8.6-50 MG tablet Take 1 tablet by mouth 2 times daily       simvastatin (ZOCOR) 40 MG tablet Take 40 mg by mouth At Bedtime        tamsulosin (FLOMAX) 0.4 MG capsule Take 0.4 mg by mouth daily       traZODone (DESYREL) 50 MG tablet Take 50 mg by mouth nightly as needed for sleep For 14 days.       warfarin (COUMADIN) 4 MG tablet Take 4 mg by mouth daily         ROS:  4 point ROS including Respiratory, CV, GI and , other than that noted in the HPI,  is negative    Exam:  /61   Pulse 73   Temp 98.8  F (37.1  C)   Resp 18   Ht 1.854 m (6' 1\")   Wt 88.2 kg (194 lb 6.4 oz)   SpO2 95%   BMI 25.65 kg/m    GENERAL APPEARANCE:  Alert, in no distress  ENT:  Mouth and posterior oropharynx normal, moist mucous membranes, hearing acuity adequate   EYES:  EOM, conjunctivae, lids, pupils and irises normal    RESP:  respiratory effort and palpation of chest normal, no respiratory distress, Lung sounds clear  CV:  Palpation and auscultation of heart done , rate and rhythm reg, no murmur, no rub or gallop, Edema 1+ on right. Trace on left  ABDOMEN:  normal bowel sounds, soft, nontender, no hepatosplenomegaly or other masses  M/S:   Gait and station steady, Digits and nails normal   SKIN:  Inspection/Palpation of skin and subcutaneous tissue incisions on " right thigh- staples intact. There is surrounding erythema.   NEURO: 2-12 in normal limits and at patient's baseline  PSYCH:  insight and judgement, memory intact , affect and mood normal      Lab/Diagnostic data:  [facility] labs:  PROTHROMBIN (PT) & INR (12/27/2018 6:24 AM CST)  PROTHROMBIN (PT) & INR (12/27/2018 6:24 AM CST)   Component Value Ref Range Performed At   INR 1.7 (H) 0.8 - 1.2 [facility]   PT 18.7 (H) 9.0 - 12.5 sec      HEMOGLOBIN (12/26/2018 10:09 AM CST)  HEMOGLOBIN (12/26/2018 10:09 AM CST)   Component Value Ref Range Performed At   Hgb 7.3 (L) 13.1 - 17.5 g/dL [facility]   Hemoglobin Performed at: [facility]  Comment:   [facility]  [address] [place] [id number]          CBC WITH PLATELET (12/26/2018 5:14 AM CST)  CBC WITH PLATELET (12/26/2018 5:14 AM CST)   Component Value Ref Range Performed At   WBC 7.76 4.00 - 10.00 k/cmm [facility]   RBC 2.57 (L) 4.60 - 6.00 m/cmm [facility]   Hgb 6.9 (AA) 13.1 - 17.5 g/dL [facility]   Hematocrit 22.8 (L) 40.0 - 51.0 % [facility]   MCV 88.7 80.0 - 100.0 fL [facility]   MCH 26.8 25.0 - 32.0 pg [facility]   MCHC 30.3 (L) 31.0 - 36.0 g/dL [facility]   RDW 18.9 (H) 11.5 - 14.5 % [facility]   Plt 352 150 - 400 k/cmm [facility]   MPV 9.8 6.5 - 12.5 fL [facility]   CBC Plt Performed at: [facility]  Comment:   [facility]  [address] [place] [id number]     [facility]   NRBC 0.0 0.0 - 0.0 % [facility]   Smear Review See CommentComment: Slide reviewed for anemia by a Laboratory Technologist.        PANEL BASIC METABOLIC (BMP) (12/23/2018 5:47 AM CST)  PANEL BASIC METABOLIC (BMP) (12/23/2018 5:47 AM CST)   Component Value Ref Range Performed At   Sodium 135 135 - 148 mEq/L [facility]   Potassium 3.5 3.5 - 5.3 mEq/L [facility]   Chloride 95 92 - 108 mEq/L [facility]   CO2 29 22 - 30 mEq/L [facility]   AnGap 11 8 - 16 mEq/L [facility]   Glucose 136 (H) 70 - 100 mg/dL [facility]   BUN 16 8 - 23 mg/dL [facility]   Creatinine 0.88 0.70 - 1.25 mg/dL [facility]   Calcium 8.4 (L) 8.8 - 10.2 mg/dL [facility]    eGFR,  101Comment: Calculated using CKD-EPI equation >=60 ml/min/1.73m2 [facility]   eGFR, Non- 87Comment: Calculated using CKD-EPI equation >=60 ml/min/1.73m2 [facility]   Basic Metabolic Panel Performed at: [facility]  Comment:   [facility]  [address] [place] [address]                    ASSESSMENT/PLAN:  (Z95.828) S/P femoral-popliteal bypass surgery  (primary encounter diagnosis)  (I73.9) PAD (peripheral artery disease) (H)  Comment: recent hospitalization due to RLE claudication. Patient underwent successful right common femoral endarterectomy, right popliteal artery endarterectomy and femoral to popliteal bypass with in situ saphenous vein graft. Staples remain intact. There is erythema surrounding incisions.   Plan: follow up with vascular surgery clinic in two weeks for staple removal (1/9/19)  ACE wrap to RLE, elevate between therapy.     (I82.411) Acute deep vein thrombosis (DVT) of femoral vein of right lower extremity (H)  (Z51.81,  Z79.01) Anticoagulation management encounter  Comment: INR subtherapeutic. INR goal 2-3  Plan: coumadin 5mg today and tomorrow. 3mg on Sunday. INR on 12/31. Continue lovenox until therapeutic.     (D62) Anemia due to blood loss, acute  Comment: required 1 unit(s) PRBC for hgb of 7 while hospitalized.   Plan: CBC 12/31    (I25.10) Coronary artery disease involving native coronary artery of native heart without angina pectoris  (I10) Essential hypertension  (E78.5) Hyperlipidemia LDL goal <100  Comment: h/o stents. No recent lipid check. BPs ok  Plan: continue current meds, BMP on 12/31    (Z87.442) History of nephrolithiasis  Comment: 2/2018- 6mm calculi on left and 4/2018 stone on right.   Plan: push fluids. Follow up with urology    (R33.9) Urinary retention  (N50.89) Scrotal edema  Comment: scrotal edema is improving. Now voiding ok.   Plan: follow up with urology, Dr Sandoval, on 1/28/19    (F51.01) Primary insomnia  Plan: continue  current meds.     (K59.09) Chronic constipation  Comment: has BM about twice a week.   Plan: monitor       Electronically signed by:  PETRA Acuña CNP

## 2018-12-28 NOTE — TELEPHONE ENCOUNTER
"Pt needs to be scheduled for consult with vascular medicine. Will route to scheduling to coordinate an appointment at next available.    12-28-18 Dr. Guzmán noted   \"S/P femoral-popliteal bypass surgery  PAD (peripheral artery disease) (H)- h/o multiple stents in right mid to distal SFA.\"     \"Patient found to have acute DVT of right femoral vein on post op day two. He was started on heparin and warfarin on 12/20. Patient was offered suction thrombectomy by IR but he declined.\"    Pt is on coumadin.     Annie Roman, TANNERN, RN  "

## 2018-12-28 NOTE — TELEPHONE ENCOUNTER
Received a call from Janette at [facility].  She wants to schedule appointment for Chepe for right leg DVT and discuss possible filter.  In looking at the chart, patient was discharged from [facility] s/p right leg bypass and right leg DVT.  Dr. Guzmán who saw pt at [facility] wants patient seen here for DVT (not bypass as pt has f/u at [facility] for bypass).  I will route to nurses for review.  Janette can be reached at [phone]. Nuris Mcmillan,

## 2018-12-28 NOTE — LETTER
12/28/2018        RE: Chepe Webster  [address]  [facility] [address]        [facility]  PRIMARY CARE PROVIDER AND CLINIC:  Amandeep De Leon [address]  [address] / [facility] [address]  Chief Complaint   Patient presents with     Hospital F/U     [place] Medical Record Number:  [id number]  Place of Service where encounter took place:  [facility] [[id number]]    HPI:    Chepe Webster is a 68 year old  (1950),admitted to the above facility from  [facility].  Hospital stay 12/17/2018 through 12/27/2018.  Admitted to this facility for  rehab, medical management and nursing care.  HPI information obtained from: facility chart records, facility staff, patient report and Care Everywhere Epic chart review.  Current issues are:      S/P femoral-popliteal bypass surgery  PAD (peripheral artery disease) (H)- h/o multiple stents in right mid to distal SFA.   Patient transferred to TCU from hospital after treatment for RLE claudication. On 12/17 he underwent a right common femoral endarterectomy, right popliteal artery endarterectomy and femoral to popliteal bypass with in situ saphenous vein graft. Post op issues include acute DVT of right femoral vein, urinary retention, scrotal swelling, and acute blood loss anemia.   Today he has very little pain. He is up and walking with a walker. He hopes to return home next Thursday.     Acute deep vein thrombosis (DVT) of femoral vein of right lower extremity (H)  Anticoagulation management encounter  Patient found to have acute DVT of right femoral vein on post op day two. He was started on heparin and warfarin on 12/20. Patient was offered suction thrombectomy by IR but he declined.   INR goal 2-3 and should continue warfarin for 3-6 mos.   INR today 1.5   ABLA- hgb dropped to 7.0 from 9.4.on POD#3. He was transfused one unit PRBC. hgb was 12.3 in November 2016. No record of iron studies  Coronary artery  disease involving native coronary artery of native heart without angina pectoris  Hyperlipidemia LDL goal <100  Hypertension  BP's: 101/61, 127/68, 131/68  s/p PCI to RCA with BMS on 12/30/12.   He continues on metoprolol, simvastatin.     History of nephrolithiasis- bilateral.   Urinary retention  Scrotal edema  Urology was consulted during this hospitalization due to urinary retention. flomax was started. Prior to transfer to TCU he was able to empty his bladder. He did develop scrotal edema during hospital stay.     Primary insomnia  Takes trazodone. Melatonin    chronic constipation- patient reports for past 30 years he has bowel movement every 4 days. He did have BM yesterday and today. Recommendation made for colonoscopy due to fecal occult pos for blood.         CODE STATUS/ADVANCE DIRECTIVES DISCUSSION:   CPR/Full code   Patient's living condition: lives alone in four plex    ALLERGIES:No known allergies  PAST MEDICAL HISTORY:  has a past medical history of Other specified urticaria, Pure hypercholesterolemia, and Tobacco use disorder.  PAST SURGICAL HISTORY:  has a past surgical history that includes cystoscopy; hc ureteral stent repositioning (Left, 02/26/2016); and as revascularize fem/pop artery, angioplasty/stent.  FAMILY HISTORY: family history includes Unknown/Adopted in his father and mother.  SOCIAL HISTORY:  reports that he has been smoking cigars.  he has never used smokeless tobacco. He reports that he does not drink alcohol or use drugs.    Post Discharge Medication Reconciliation Status: discharge medications reconciled, continue medications without change.  Current Outpatient Medications   Medication Sig Dispense Refill     acetaminophen (TYLENOL) 325 MG tablet Take 650 mg by mouth every 4 hours as needed for mild pain       aspirin EC 81 MG EC tablet Take 81 mg by mouth daily        bisacodyl (DULCOLAX) 10 MG suppository Place 10 mg rectally daily as needed for constipation       calcium  "carbonate (TUMS) 500 MG chewable tablet Take 1 chew tab by mouth 3 times daily as needed for heartburn       diphenhydrAMINE (BENADRYL) 50 MG capsule Take 50 mg by mouth every 4 hours as needed        enoxaparin (LOVENOX) 80 MG/0.8ML syringe Inject 80 mg Subcutaneous every 12 hours       melatonin 3 MG tablet Take 1 mg by mouth nightly as needed for sleep For 14 days       metoprolol tartrate (LOPRESSOR) 25 MG tablet Take 12.5 mg by mouth 2 times daily       ondansetron (ZOFRAN) 4 MG tablet Take 4 mg by mouth every 6 hours as needed for nausea       oxyCODONE (ROXICODONE) 5 MG tablet Take 5-10 mg by mouth every 4 hours as needed for severe pain       polyethylene glycol (MIRALAX/GLYCOLAX) packet Take 1 packet by mouth 2 times daily       senna-docusate (SENOKOT-S/PERICOLACE) 8.6-50 MG tablet Take 1 tablet by mouth 2 times daily       simvastatin (ZOCOR) 40 MG tablet Take 40 mg by mouth At Bedtime        tamsulosin (FLOMAX) 0.4 MG capsule Take 0.4 mg by mouth daily       traZODone (DESYREL) 50 MG tablet Take 50 mg by mouth nightly as needed for sleep For 14 days.       warfarin (COUMADIN) 4 MG tablet Take 4 mg by mouth daily         ROS:  4 point ROS including Respiratory, CV, GI and , other than that noted in the HPI,  is negative    Exam:  /61   Pulse 73   Temp 98.8  F (37.1  C)   Resp 18   Ht 1.854 m (6' 1\")   Wt 88.2 kg (194 lb 6.4 oz)   SpO2 95%   BMI 25.65 kg/m     GENERAL APPEARANCE:  Alert, in no distress  ENT:  Mouth and posterior oropharynx normal, moist mucous membranes, hearing acuity adequate   EYES:  EOM, conjunctivae, lids, pupils and irises normal    RESP:  respiratory effort and palpation of chest normal, no respiratory distress, Lung sounds clear  CV:  Palpation and auscultation of heart done , rate and rhythm reg, no murmur, no rub or gallop, Edema 1+ on right. Trace on left  ABDOMEN:  normal bowel sounds, soft, nontender, no hepatosplenomegaly or other masses  M/S:   Gait and " station steady, Digits and nails normal   SKIN:  Inspection/Palpation of skin and subcutaneous tissue incisions on right thigh- staples intact. There is surrounding erythema.   NEURO: 2-12 in normal limits and at patient's baseline  PSYCH:  insight and judgement, memory intact , affect and mood normal      Lab/Diagnostic data:  [facility]:  PROTHROMBIN (PT) & INR (12/27/2018 6:24 AM CST)  PROTHROMBIN (PT) & INR (12/27/2018 6:24 AM CST)   Component Value Ref Range Performed At   INR 1.7 (H) 0.8 - 1.2 [facility]   PT 18.7 (H) 9.0 - 12.5 sec      HEMOGLOBIN (12/26/2018 10:09 AM CST)  HEMOGLOBIN (12/26/2018 10:09 AM CST)   Component Value Ref Range Performed At   Hgb 7.3 (L) 13.1 - 17.5 g/dL [facility]   Hemoglobin Performed at: [facility]  Comment:   [facility]  [address] [place] [address]          CBC WITH PLATELET (12/26/2018 5:14 AM CST)  CBC WITH PLATELET (12/26/2018 5:14 AM CST)   Component Value Ref Range Performed At   WBC 7.76 4.00 - 10.00 k/cmm [facility]   RBC 2.57 (L) 4.60 - 6.00 m/cmm [facility]   Hgb 6.9 (AA) 13.1 - 17.5 g/dL [facility]   Hematocrit 22.8 (L) 40.0 - 51.0 % [facility]   MCV 88.7 80.0 - 100.0 fL [facility]   MCH 26.8 25.0 - 32.0 pg [facility]   MCHC 30.3 (L) 31.0 - 36.0 g/dL [facility]   RDW 18.9 (H) 11.5 - 14.5 % [facility]   Plt 352 150 - 400 k/cmm [facility]   MPV 9.8 6.5 - 12.5 fL [facility]   CBC Plt Performed at: [facility]  Comment:   [facility]  [address] [place] [address]     [facility]   NRBC 0.0 0.0 - 0.0 % [facility]   Smear Review See CommentComment: Slide reviewed for anemia by a Laboratory Technologist.        PANEL BASIC METABOLIC (BMP) (12/23/2018 5:47 AM CST)  PANEL BASIC METABOLIC (BMP) (12/23/2018 5:47 AM CST)   Component Value Ref Range Performed At   Sodium 135 135 - 148 mEq/L [facility]   Potassium 3.5 3.5 - 5.3 mEq/L [facility]   Chloride 95 92 - 108 mEq/L [facility]   CO2 29 22 - 30 mEq/L [facility]   AnGap 11 8 - 16 mEq/L [facility]   Glucose 136 (H) 70 - 100 mg/dL [facility]   BUN 16 8  - 23 mg/dL [facility]   Creatinine 0.88 0.70 - 1.25 mg/dL [facility]   Calcium 8.4 (L) 8.8 - 10.2 mg/dL [facility]   eGFR,  101Comment: Calculated using CKD-EPI equation >=60 ml/min/1.73m2 [facility]   eGFR, Non- 87Comment: Calculated using CKD-EPI equation >=60 ml/min/1.73m2 [facility]   Basic Metabolic Panel Performed at: [facility]  Comment:   [facility]  [address] [place] [id number]                    ASSESSMENT/PLAN:  (Z95.828) S/P femoral-popliteal bypass surgery  (primary encounter diagnosis)  (I73.9) PAD (peripheral artery disease) (H)  Comment: recent hospitalization due to RLE claudication. Patient underwent successful right common femoral endarterectomy, right popliteal artery endarterectomy and femoral to popliteal bypass with in situ saphenous vein graft. Staples remain intact. There is erythema surrounding incisions.   Plan: follow up with vascular surgery clinic in two weeks for staple removal (1/9/19)  ACE wrap to RLE, elevate between therapy.     (I82.411) Acute deep vein thrombosis (DVT) of femoral vein of right lower extremity (H)  (Z51.81,  Z79.01) Anticoagulation management encounter  Comment: INR subtherapeutic. INR goal 2-3  Plan: coumadin 5mg today and tomorrow. 3mg on Sunday. INR on 12/31. Continue lovenox until therapeutic.     (D62) Anemia due to blood loss, acute  Comment: required 1 unit(s) PRBC for hgb of 7 while hospitalized.   Plan: CBC 12/31    (I25.10) Coronary artery disease involving native coronary artery of native heart without angina pectoris  (I10) Essential hypertension  (E78.5) Hyperlipidemia LDL goal <100  Comment: h/o stents. No recent lipid check. BPs ok  Plan: continue current meds, BMP on 12/31    (Z87.442) History of nephrolithiasis  Comment: 2/2018- 6mm calculi on left and 4/2018 stone on right.   Plan: push fluids. Follow up with urology    (R33.9) Urinary retention  (N50.89) Scrotal edema  Comment: scrotal edema is improving. Now  voiding ok.   Plan: follow up with urology, Dr Sandoval, on 1/28/19    (F51.01) Primary insomnia  Plan: continue current meds.     (K59.09) Chronic constipation  Comment: has BM about twice a week.   Plan: monitor       Electronically signed by:  PETRA Acuña CNP                    Sincerely,        PETRA Acuña CNP

## 2018-12-30 VITALS
OXYGEN SATURATION: 100 % | TEMPERATURE: 98 F | BODY MASS INDEX: 25.5 KG/M2 | HEIGHT: 73 IN | RESPIRATION RATE: 16 BRPM | SYSTOLIC BLOOD PRESSURE: 106 MMHG | WEIGHT: 192.4 LBS | DIASTOLIC BLOOD PRESSURE: 59 MMHG | HEART RATE: 68 BPM

## 2018-12-30 ASSESSMENT — MIFFLIN-ST. JEOR: SCORE: 1686.6

## 2018-12-31 ENCOUNTER — NURSING HOME VISIT (OUTPATIENT)
Dept: GERIATRICS | Facility: CLINIC | Age: 68
End: 2018-12-31
Payer: COMMERCIAL

## 2018-12-31 ENCOUNTER — TRANSFERRED RECORDS (OUTPATIENT)
Dept: HEALTH INFORMATION MANAGEMENT | Facility: CLINIC | Age: 68
End: 2018-12-31

## 2018-12-31 DIAGNOSIS — I73.9 PAD (PERIPHERAL ARTERY DISEASE) (H): Primary | ICD-10-CM

## 2018-12-31 DIAGNOSIS — I10 ESSENTIAL HYPERTENSION: ICD-10-CM

## 2018-12-31 DIAGNOSIS — R33.9 URINARY RETENTION: ICD-10-CM

## 2018-12-31 DIAGNOSIS — I25.10 CORONARY ARTERY DISEASE INVOLVING NATIVE CORONARY ARTERY OF NATIVE HEART WITHOUT ANGINA PECTORIS: ICD-10-CM

## 2018-12-31 DIAGNOSIS — Z95.828 S/P FEMORAL-POPLITEAL BYPASS SURGERY: ICD-10-CM

## 2018-12-31 DIAGNOSIS — N50.89 SCROTAL EDEMA: ICD-10-CM

## 2018-12-31 DIAGNOSIS — I82.411 ACUTE DEEP VEIN THROMBOSIS (DVT) OF FEMORAL VEIN OF RIGHT LOWER EXTREMITY (H): ICD-10-CM

## 2018-12-31 DIAGNOSIS — D62 ANEMIA DUE TO BLOOD LOSS, ACUTE: ICD-10-CM

## 2018-12-31 DIAGNOSIS — R53.81 PHYSICAL DECONDITIONING: ICD-10-CM

## 2018-12-31 LAB
ANION GAP SERPL CALCULATED.3IONS-SCNC: 9 MMOL/L (ref 3–14)
BUN SERPL-MCNC: 16 MG/DL (ref 7–30)
CALCIUM SERPL-MCNC: 8.3 MG/DL (ref 8.5–10.1)
CHLORIDE SERPLBLD-SCNC: 103 MMOL/L (ref 94–109)
CO2 SERPL-SCNC: 23 MMOL/L (ref 20–32)
CREAT SERPL-MCNC: 0.93 MG/DL (ref 0.52–1.04)
ERYTHROCYTE [DISTWIDTH] IN BLOOD BY AUTOMATED COUNT: 19.4 % (ref 10–15)
GFR SERPL CREATININE-BSD FRML MDRD: 62 ML/MIN/1.73M2
GLUCOSE SERPL-MCNC: 111 MG/DL (ref 70–99)
HCT VFR BLD AUTO: 25.9 % (ref 35–47)
HEMOGLOBIN: 8 G/DL (ref 11.7–15.7)
MCH RBC QN AUTO: 27 PG (ref 26.5–33)
MCHC RBC AUTO-ENTMCNC: 30.9 G/DL (ref 31.5–36.5)
MCV RBC AUTO: 88 FL (ref 78–100)
PLATELET # BLD AUTO: 618 10E9/L (ref 150–450)
POTASSIUM SERPL-SCNC: 4.1 MMOL/L (ref 3.4–5.3)
RBC # BLD AUTO: 2.96 10E12/L (ref 3.8–5.2)
SODIUM SERPL-SCNC: 135 MMOL/L (ref 133–144)
WBC # BLD AUTO: 6.7 10E9/L (ref 4–11)

## 2018-12-31 PROCEDURE — 99306 1ST NF CARE HIGH MDM 50: CPT | Performed by: INTERNAL MEDICINE

## 2018-12-31 NOTE — LETTER
"    12/31/2018        RE: Chepe Webster  [address]  [facility] [place] [address]        Chepe Webster is a 68 year old male seen December 31, 2018 at [facility] where he was admitted this week [place] [facility] [place] for which he underwent right femoral-popliteal bypass surgery on 12/17/18     His post op course was complicated by acute DVT of his right femoral vein, anemia, urinary retention and scrotal edema.   Pt was transfused one unit pRBCs, and started on heparin/warfarin for DVT.   His scrotal edema and urinary retention eventually resolved, still has swelling of his entire right leg.      Does not have much pain, reports it has been 36 hours since his last pain med.   \"To me, it's getting better.\"     He is worried about persistent edema of his leg and asking how long before LE edema resolves.     He has been working with therapies with goal of returning home later this week.       Past Medical History:   Diagnosis Date     Other specified urticaria      Pure hypercholesterolemia      Tobacco use disorder    PAD, s/p fem-pop bypass  RLE DVT  CAD  Nephrolithiasis, 2017      Past Surgical History:   Procedure Laterality Date     AS REVASCULARIZE FEM/POP ARTERY, ANGIOPLASTY/STENT      multiple in '15 and '17     CYSTOSCOPY      multiple      HC URETERAL STENT REPOSITIONING Left 02/26/2016       Family History   Problem Relation Age of Onset     Unknown/Adopted Mother      Unknown/Adopted Father      Diabetes No family hx of      Coronary Artery Disease No family hx of      Hypertension No family hx of      Hyperlipidemia No family hx of      Cerebrovascular Disease No family hx of      Breast Cancer No family hx of      Colon Cancer No family hx of      Prostate Cancer No family hx of      Other Cancer No family hx of      Depression No family hx of      Anxiety Disorder No family hx of      Mental Illness No family hx of      Substance Abuse No family hx of      Anesthesia " "Reaction No family hx of      Asthma No family hx of      Osteoporosis No family hx of      Genetic Disorder No family hx of      Thyroid Disease No family hx of      Obesity No family hx of        Social History     Tobacco Use     Smoking status: Current Some Day Smoker     Types: Cigars     Smokeless tobacco: Never Used     Tobacco comment: 12/18- down to cigars twice a week   Substance Use Topics     Alcohol use: No      SH:  Lives alone, 4plex apartment in [facility]   He has steps to enter his apartment, but all one level once he is in.      He has a daughter Jeannette who helps out.   Pt is a retired teacher/principal    Review Of Systems  Skin: negative   Eyes: impaired vision  Ears/Nose/Throat: hearing loss  Respiratory: No shortness of breath, dyspnea on exertion, cough, or hemoptysis  Cardiovascular: negative  Gastrointestinal: constipation  Genitourinary: retention  Musculoskeletal: previously ambulatory without device, now using FWW with green tag.     Neurologic: negative  Psychiatric: negative  Hematologic/Lymphatic/Immunologic: anemia and bleeding disorder  Endocrine: negative      GENERAL APPEARANCE: alert and no distress  /59   Pulse 68   Temp 98  F (36.7  C)   Resp 16   Ht 1.854 m (6' 1\")   Wt 87.3 kg (192 lb 6.4 oz)   SpO2 100%   BMI 25.38 kg/m      HEENT: normocephalic, no lesion or abnormalities  NECK: no adenopathy, no asymmetry, masses, or scars and thyroid normal to palpation  RESP: lungs clear to auscultation - no rales, rhonchi or wheezes  CV: regular rate and rhythm, normal S1 S2  ABDOMEN:  soft, nontender, no HSM or masses and bowel sounds normal  Scrotum with minimum edema, no erythema.    MS: RLE with 2-3+ edema entire leg.     SKIN: no suspicious lesions or rashes; 3 RLE incisions with staples intact, some erythema.  No drainage.     NEURO: Normal strength and tone, sensory exam grossly normal, and speech normal  PSYCH: affect okay  LYMPHATICS: No cervical,  or supraclavicular " nodes     Today's labs reviewed.    IMP/PLAN:    (I73.9) PAD (peripheral artery disease) (H)  (primary encounter diagnosis)  (Z95.828) S/P femoral-popliteal bypass surgery  Comment: 2 weeks post op  Plan: monitor incisions; follow up with Vascular surgery    Elevation, light compression for edema.       (I82.411) Acute deep vein thrombosis (DVT) of femoral vein of right lower extremity (H)  Comment: with edema  Plan: warfarin per INR 3-6 months duration    (D62) Anemia due to blood loss, acute  Comment: s/p transfusion   Plan: follow hgb       (I10) Essential hypertension  Comment:   BP Readings from Last 3 Encounters:   12/30/18 106/59   12/28/18 101/61   12/04/18 123/76      Plan: continue metoprolol    (R33.9) Urinary retention  (N50.89) Scrotal edema  Comment: improving  Plan: continue tamsulosin, follow symptoms and exam    (I25.10) Coronary artery disease involving native coronary artery of native heart without angina pectoris  Comment: multivessel disease  Plan: daily ASA, statin, beta blocker for secondary prevention.        (R53.81) Physical deconditioning  Comment: after acute illness / surgery  Plan: PHYSICAL THERAPY / OCCUPATIONAL THERAPY for strengthening, balance, gait, ADLs.     Discharge goal is return home, prior level of independence.        Jenn Wheat MD       Sincerely,        Jenn Wheat MD

## 2019-01-01 NOTE — PROGRESS NOTES
"Chepe Webster is a 68 year old male seen December 31, 2018 at [facility] where he was [facility] this week after [facility] [place] hospitalization for PAD for which he underwent right femoral-popliteal bypass surgery on 12/17/18     His post op course was complicated by acute DVT of his right femoral vein, anemia, urinary retention and scrotal edema.   Pt was transfused one unit pRBCs, and started on heparin/warfarin for DVT.   His scrotal edema and urinary retention eventually resolved, still has swelling of his entire right leg.      Does not have much pain, reports it has been 36 hours since his last pain med.   \"To me, it's getting better.\"     He is worried about persistent edema of his leg and asking how long before LE edema resolves.     He has been working with therapies with goal of returning home later this week.       Past Medical History:   Diagnosis Date     Other specified urticaria      Pure hypercholesterolemia      Tobacco use disorder    PAD, s/p fem-pop bypass  RLE DVT  CAD  Nephrolithiasis, 2017      Past Surgical History:   Procedure Laterality Date     AS REVASCULARIZE FEM/POP ARTERY, ANGIOPLASTY/STENT      multiple in '15 and '17     CYSTOSCOPY      multiple      HC URETERAL STENT REPOSITIONING Left 02/26/2016       Family History   Problem Relation Age of Onset     Unknown/Adopted Mother      Unknown/Adopted Father      Diabetes No family hx of      Coronary Artery Disease No family hx of      Hypertension No family hx of      Hyperlipidemia No family hx of      Cerebrovascular Disease No family hx of      Breast Cancer No family hx of      Colon Cancer No family hx of      Prostate Cancer No family hx of      Other Cancer No family hx of      Depression No family hx of      Anxiety Disorder No family hx of      Mental Illness No family hx of      Substance Abuse No family hx of      Anesthesia Reaction No family hx of      Asthma No family hx of      Osteoporosis No family hx of      Genetic Disorder " "No family hx of      Thyroid Disease No family hx of      Obesity No family hx of        Social History     Tobacco Use     Smoking status: Current Some Day Smoker     Types: Cigars     Smokeless tobacco: Never Used     Tobacco comment: 12/18- down to cigars twice a week   Substance Use Topics     Alcohol use: No      SH:  Lives alone, 4plex apartment in [facility]   He has steps to enter his apartment, but all one level once he is in.      He has a daughter Jeannette who helps out.   Pt is a retired teacher/principal    Review Of Systems  Skin: negative   Eyes: impaired vision  Ears/Nose/Throat: hearing loss  Respiratory: No shortness of breath, dyspnea on exertion, cough, or hemoptysis  Cardiovascular: negative  Gastrointestinal: constipation  Genitourinary: retention  Musculoskeletal: previously ambulatory without device, now using FWW with green tag.     Neurologic: negative  Psychiatric: negative  Hematologic/Lymphatic/Immunologic: anemia and bleeding disorder  Endocrine: negative      GENERAL APPEARANCE: alert and no distress  /59   Pulse 68   Temp 98  F (36.7  C)   Resp 16   Ht 1.854 m (6' 1\")   Wt 87.3 kg (192 lb 6.4 oz)   SpO2 100%   BMI 25.38 kg/m     HEENT: normocephalic, no lesion or abnormalities  NECK: no adenopathy, no asymmetry, masses, or scars and thyroid normal to palpation  RESP: lungs clear to auscultation - no rales, rhonchi or wheezes  CV: regular rate and rhythm, normal S1 S2  ABDOMEN:  soft, nontender, no HSM or masses and bowel sounds normal  Scrotum with minimum edema, no erythema.    MS: RLE with 2-3+ edema entire leg.     SKIN: no suspicious lesions or rashes; 3 RLE incisions with staples intact, some erythema.  No drainage.     NEURO: Normal strength and tone, sensory exam grossly normal, and speech normal  PSYCH: affect okay  LYMPHATICS: No cervical,  or supraclavicular nodes     Today's labs reviewed.    IMP/PLAN:    (I73.9) PAD (peripheral artery disease) (H)  (primary encounter " diagnosis)  (Z95.828) S/P femoral-popliteal bypass surgery  Comment: 2 weeks post op  Plan: monitor incisions; follow up with Vascular surgery    Elevation, light compression for edema.       (I82.411) Acute deep vein thrombosis (DVT) of femoral vein of right lower extremity (H)  Comment: with edema  Plan: warfarin per INR 3-6 months duration    (D62) Anemia due to blood loss, acute  Comment: s/p transfusion   Plan: follow hgb       (I10) Essential hypertension  Comment:   BP Readings from Last 3 Encounters:   12/30/18 106/59   12/28/18 101/61   12/04/18 123/76      Plan: continue metoprolol    (R33.9) Urinary retention  (N50.89) Scrotal edema  Comment: improving  Plan: continue tamsulosin, follow symptoms and exam    (I25.10) Coronary artery disease involving native coronary artery of native heart without angina pectoris  Comment: multivessel disease  Plan: daily ASA, statin, beta blocker for secondary prevention.        (R53.81) Physical deconditioning  Comment: after acute illness / surgery  Plan: PHYSICAL THERAPY / OCCUPATIONAL THERAPY for strengthening, balance, gait, ADLs.     Discharge goal is return home, prior level of independence.        Jenn Wheat MD

## 2019-01-02 ENCOUNTER — OFFICE VISIT (OUTPATIENT)
Dept: OTHER | Facility: CLINIC | Age: 69
End: 2019-01-02
Attending: INTERNAL MEDICINE
Payer: MEDICARE

## 2019-01-02 ENCOUNTER — TELEPHONE (OUTPATIENT)
Dept: OTHER | Facility: CLINIC | Age: 69
End: 2019-01-02

## 2019-01-02 ENCOUNTER — NURSING HOME VISIT (OUTPATIENT)
Dept: GERIATRICS | Facility: CLINIC | Age: 69
End: 2019-01-02
Payer: MEDICARE

## 2019-01-02 VITALS
TEMPERATURE: 98.1 F | OXYGEN SATURATION: 97 % | HEIGHT: 73 IN | WEIGHT: 190.8 LBS | HEART RATE: 72 BPM | BODY MASS INDEX: 25.29 KG/M2 | DIASTOLIC BLOOD PRESSURE: 69 MMHG | SYSTOLIC BLOOD PRESSURE: 117 MMHG | RESPIRATION RATE: 16 BRPM

## 2019-01-02 VITALS
OXYGEN SATURATION: 98 % | HEART RATE: 73 BPM | SYSTOLIC BLOOD PRESSURE: 119 MMHG | DIASTOLIC BLOOD PRESSURE: 68 MMHG | WEIGHT: 193.6 LBS | BODY MASS INDEX: 25.54 KG/M2

## 2019-01-02 DIAGNOSIS — Z79.01 ANTICOAGULATION MANAGEMENT ENCOUNTER: ICD-10-CM

## 2019-01-02 DIAGNOSIS — Z95.828 S/P FEMORAL-POPLITEAL BYPASS SURGERY: ICD-10-CM

## 2019-01-02 DIAGNOSIS — I82.411 ACUTE DEEP VEIN THROMBOSIS (DVT) OF FEMORAL VEIN OF RIGHT LOWER EXTREMITY (H): Primary | ICD-10-CM

## 2019-01-02 DIAGNOSIS — Z51.81 ANTICOAGULATION MANAGEMENT ENCOUNTER: ICD-10-CM

## 2019-01-02 DIAGNOSIS — Z79.01 LONG TERM (CURRENT) USE OF ANTICOAGULANTS: ICD-10-CM

## 2019-01-02 DIAGNOSIS — Z51.81 ENCOUNTER FOR THERAPEUTIC DRUG MONITORING: ICD-10-CM

## 2019-01-02 PROCEDURE — 99204 OFFICE O/P NEW MOD 45 MIN: CPT | Mod: ZP | Performed by: INTERNAL MEDICINE

## 2019-01-02 PROCEDURE — 99308 SBSQ NF CARE LOW MDM 20: CPT | Performed by: NURSE PRACTITIONER

## 2019-01-02 PROCEDURE — G0463 HOSPITAL OUTPT CLINIC VISIT: HCPCS

## 2019-01-02 ASSESSMENT — MIFFLIN-ST. JEOR: SCORE: 1679.34

## 2019-01-02 NOTE — PROGRESS NOTES
Vascular Medicine Consultation     Chief Complaint   Right leg swelling    .    Code Status    Full code    Reason for Consult   Reason for consult: I was asked by PCP to evaluate this patient for right leg swelling.    Primary Care Physician   Amandeep De Leon      History is obtained from the patient    History of Present Illness   Chepe Webster is a 68 year old male who presents with right leg swelling status post acute right superficial femoral and popliteal vein thrombosis post right femoropopliteal bypass [facility] this [facility] [place] [facility] [place] for which he underwent right femoral-popliteal bypass surgery on 12/17/18     His post op course was complicated by acute DVT of his right femoral vein, anemia, urinary retention and scrotal edema.   Pt was transfused one unit pRBCs, and started on heparin/warfarin for DVT.   His scrotal edema and urinary retention eventually resolved, still has swelling of his entire right leg.      Does not have much pain,     He is worried about persistent edema of his leg and asking how long before LE edema resolves.      Patient is here for second opinion regarding thrombolyzes after mechanical thrombectomy that was offered to [place]  [facility], patient was asking about his options  I went ahead and I explained to him that he is still in the window.  That mechanical thrombectomy/thrombolysis can benefit his condition and reduce the risk of having PTS in the future versus being treated with anticoagulation alone  Patient expressed his wish to contact Dr. WHITE at [facility] to proceed with the procedure suggested  Patient will continue with leg elevation, continue with compression, and being on Lovenox the dose of which is based on his body weight  If [facility] cannot accommodate him then will consult IR department here and the procedure can be done this week    Past Medical History   I have reviewed this patient's medical history and  updated it with pertinent information if needed.   Past Medical History:   Diagnosis Date     Other specified urticaria      Pure hypercholesterolemia      Tobacco use disorder        Past Surgical History   I have reviewed this patient's surgical history and updated it with pertinent information if needed.  Past Surgical History:   Procedure Laterality Date     AS REVASCULARIZE FEM/POP ARTERY, ANGIOPLASTY/STENT      multiple in '15 and '17     CYSTOSCOPY      multiple      HC URETERAL STENT REPOSITIONING Left 02/26/2016       Prior to Admission Medications   Cannot display prior to admission medications because the patient has not been admitted in this contact.     Allergies   Allergies   Allergen Reactions     No Known Allergies        Social History   I have reviewed this patient's social history and updated it with pertinent information if needed. Chepe ACUÑA Darin  reports that he quit smoking about 2 weeks ago. His smoking use included cigars. he has never used smokeless tobacco. He reports that he does not drink alcohol or use drugs.    Family History   I have reviewed this patient's family history and updated it with pertinent information if needed.   Family History   Problem Relation Age of Onset     Unknown/Adopted Mother      Unknown/Adopted Father      Diabetes No family hx of      Coronary Artery Disease No family hx of      Hypertension No family hx of      Hyperlipidemia No family hx of      Cerebrovascular Disease No family hx of      Breast Cancer No family hx of      Colon Cancer No family hx of      Prostate Cancer No family hx of      Other Cancer No family hx of      Depression No family hx of      Anxiety Disorder No family hx of      Mental Illness No family hx of      Substance Abuse No family hx of      Anesthesia Reaction No family hx of      Asthma No family hx of      Osteoporosis No family hx of      Genetic Disorder No family hx of      Thyroid Disease No family hx of      Obesity No  family hx of        Review of Systems   The 10 point Review of Systems is negative other than noted in the HPI or here.     Physical Exam       BP: 119/68 Pulse: 73     SpO2: 98 %      Vital Signs with Ranges  Temp:  [98.1  F (36.7  C)] 98.1  F (36.7  C)  Pulse:  [72-74] 73  Resp:  [16] 16  BP: (117-125)/(68-69) 119/68  SpO2:  [97 %-98 %] 98 %  193 lbs 9.6 oz    Constitutional: awake, alert, cooperative, no apparent distress, and appears stated age  Eyes: Lids and lashes normal, pupils equal, round and reactive to light, extra ocular muscles intact, sclera clear, conjunctiva normal  ENT: normocepalic, without obvious abnormality, oropharynx pink and moist  Hematologic / Lymphatic: no lymphadenopathy  Respiratory: No increased work of breathing, good air exchange, clear to auscultation bilaterally, no crackles or wheezing  Cardiovascular: regular rate and rhythm, normal S1 and S2 and no murmur noted  GI: Normal bowel sounds, soft, non-distended, non-tender  Skin: no redness, warmth, or swelling, no rashes and no lesions  Musculoskeletal: There is no redness, warmth, or swelling of the joints.  Full range of motion noted.  Motor strength is 5 out of 5 all extremities bilaterally.  Tone is normal.  Neurologic: Awake, alert, oriented to name, place and time.  Cranial nerves II-XII are grossly intact.  Motor is 5 out of 5 bilaterally.    Neuropsychiatric:  Normal affect, memory, insight.  Pulses: Intact  The site of the incision for the right femoropopliteal looks clean no signs of inflammation, infection, staples still in place  . No carotid bruits appreciated.     Data   I personally reviewed the Ultrasound image(s) showing Acute DVT involving superficial femoral and popliteal vein on the right side.  I did not see the actual images I did see the report      Assessment & Plan   No diagnosis found.    Summary: Acute right sided superficial femoral/popliteal vein DVT with leg swelling that will probably need mechanical  thrombectomy/thrombolysis  We will contact [facility], Dr. WHITE,  For now continue with weight-based Lovenox dosage twice daily  Continue with leg elevation  Continue with compression  Monitor for any evidence of bleeding or bruising or any signs of infection/inflammation at the site of femoropopliteal bypass surgery incision    Bobby Carmen MD

## 2019-01-02 NOTE — NURSING NOTE
"Chepe Webster is a 68 year old male who presents for:  Chief Complaint   Patient presents with     Consult     New consult for DVT referred by Dr. Guzmán at [facility].  [place] [facility]  [place] discovered Right leg DVT but pt declined tx while inhouse.  Dr. Guzmán would like patient seen for DVT to discuss options.  [place] is [place] [facility] [place]. Notes in Care Everywhere.         Vitals:    Vitals:    01/02/19 1024 01/02/19 1025   BP: 125/68 119/68   BP Location: Right arm Left arm   Patient Position: Chair Chair   Cuff Size: Adult Regular Adult Regular   Pulse: 74 73   SpO2: 98%    Weight: 193 lb 9.6 oz (87.8 kg)        BMI:  Estimated body mass index is 25.54 kg/m  as calculated from the following:    Height as of an earlier encounter on 1/2/19: 6' 1\" (1.854 m).    Weight as of this encounter: 193 lb 9.6 oz (87.8 kg).    Pain Score:  Data Unavailable        Haley Sales MA    "

## 2019-01-02 NOTE — PROGRESS NOTES
"  [facility]    [place] Medical Record Number:  [id number]  Place of Service where encounter took place:  [facility] [[id number]]    HPI:    Chepe Webster is a 68 year old  (1950), who is being seen today for an episodic care visit at the above location.   HPI information obtained from: facility chart records, facility staff, patient report and [facility] chart review. Today's concern is INR/Coumadin management for DVT    Bleeding Signs/Symptoms:  None  Thromboembolic Signs/Symptoms:  None    Medication Changes:  No  Dietary Changes:  Yes: at TCU  Activity Changes: Yes: at TCU  Bacterial/Viral Infection:  No    Missed Coumadin Doses:  None    On ASA: Yes - 81 mg po q day    Other Concerns:  No    OBJECTIVE:  /69   Pulse 72   Temp 98.1  F (36.7  C)   Resp 16   Ht 1.854 m (6' 1\")   Wt 86.5 kg (190 lb 12.8 oz)   SpO2 97%   BMI 25.17 kg/m     INR Today:  2.0  Current Dose:  Coumadin 5 mg PO every day on 12/28-12/29 and 12/31-1/1. Took 4 mg PO on 12/27 and 3 mg PO on 12/30    ASSESSMENT:  1. Acute deep vein thrombosis (DVT) of femoral vein of right lower extremity (H)    2. Encounter for therapeutic drug monitoring    3. Long term (current) use of anticoagulants      Therapeutic INR for goal of 2-3    PLAN:    New Dose: Coumadin 5 mg PO on 1/2 and 1/3      Next INR: 1/4    Electronically signed by:  PETRA Louise CNP        "

## 2019-01-02 NOTE — TELEPHONE ENCOUNTER
I called Dr. Hall's vascular surgery clinic at [facility] and spoke with Muriel about Chepe's   right femoral-popliteal bypass surgery on 12/17/18 and that his post op course was complicated by acute DVT of his right femoral vein, anemia, urinary retention and scrotal edema.     It was explained Dr. Carmen would like Chepe seen within the next few days for a mechanical thrombectomy. Muriel stated she will reach out to the patient today and get him scheduled in the next few days with Dr. Hall.    I faxed Dr. Carmen OV note from today to # [phone].    Dorita Roman BSN, RN

## 2019-01-02 NOTE — LETTER
"    1/2/2019        RE: Chepe Webster  [address]  [facility] [address]          [facility]    [place] Medical Record Number:  [id number]  Place of Service where encounter took place:  [facility] [[id number]]    HPI:    Chepe Webster is a 68 year old  (1950), who is being seen today for an episodic care visit at the above location.   HPI information obtained from: facility chart records, facility staff, patient report and [facility] chart review. Today's concern is INR/Coumadin management for DVT    Bleeding Signs/Symptoms:  None  Thromboembolic Signs/Symptoms:  None    Medication Changes:  No  Dietary Changes:  Yes: at TCU  Activity Changes: Yes: at TCU  Bacterial/Viral Infection:  No    Missed Coumadin Doses:  None    On ASA: Yes - 81 mg po q day    Other Concerns:  No    OBJECTIVE:  /69   Pulse 72   Temp 98.1  F (36.7  C)   Resp 16   Ht 1.854 m (6' 1\")   Wt 86.5 kg (190 lb 12.8 oz)   SpO2 97%   BMI 25.17 kg/m      INR Today:  2.0  Current Dose:  Coumadin 5 mg PO every day on 12/28-12/29 and 12/31-1/1. Took 4 mg PO on 12/27 and 3 mg PO on 12/30    ASSESSMENT:  1. Acute deep vein thrombosis (DVT) of femoral vein of right lower extremity (H)    2. Encounter for therapeutic drug monitoring    3. Long term (current) use of anticoagulants      Therapeutic INR for goal of 2-3    PLAN:    New Dose: Coumadin 5 mg PO on 1/2 and 1/3      Next INR: 1/4    Electronically signed by:  PETRA Louise CNP            Sincerely,        PETRA Louise CNP    "

## 2019-01-04 ENCOUNTER — DISCHARGE SUMMARY NURSING HOME (OUTPATIENT)
Dept: GERIATRICS | Facility: CLINIC | Age: 69
End: 2019-01-04
Payer: MEDICARE

## 2019-01-04 ENCOUNTER — TELEPHONE (OUTPATIENT)
Dept: FAMILY MEDICINE | Facility: CLINIC | Age: 69
End: 2019-01-04

## 2019-01-04 VITALS
HEART RATE: 94 BPM | DIASTOLIC BLOOD PRESSURE: 64 MMHG | BODY MASS INDEX: 25.29 KG/M2 | HEIGHT: 73 IN | SYSTOLIC BLOOD PRESSURE: 102 MMHG | RESPIRATION RATE: 16 BRPM | TEMPERATURE: 99 F | WEIGHT: 190.8 LBS | OXYGEN SATURATION: 97 %

## 2019-01-04 DIAGNOSIS — N50.89 SCROTAL EDEMA: ICD-10-CM

## 2019-01-04 DIAGNOSIS — I73.9 PAD (PERIPHERAL ARTERY DISEASE) (H): ICD-10-CM

## 2019-01-04 DIAGNOSIS — F51.01 PRIMARY INSOMNIA: ICD-10-CM

## 2019-01-04 DIAGNOSIS — Z95.828 S/P FEMORAL-POPLITEAL BYPASS SURGERY: Primary | ICD-10-CM

## 2019-01-04 DIAGNOSIS — Z51.81 ANTICOAGULATION MANAGEMENT ENCOUNTER: ICD-10-CM

## 2019-01-04 DIAGNOSIS — R33.9 URINARY RETENTION: ICD-10-CM

## 2019-01-04 DIAGNOSIS — E78.5 HYPERLIPIDEMIA LDL GOAL <100: ICD-10-CM

## 2019-01-04 DIAGNOSIS — I25.10 CORONARY ARTERY DISEASE INVOLVING NATIVE CORONARY ARTERY OF NATIVE HEART WITHOUT ANGINA PECTORIS: ICD-10-CM

## 2019-01-04 DIAGNOSIS — Z87.442 HISTORY OF NEPHROLITHIASIS: ICD-10-CM

## 2019-01-04 DIAGNOSIS — K59.09 CHRONIC CONSTIPATION: ICD-10-CM

## 2019-01-04 DIAGNOSIS — I10 ESSENTIAL HYPERTENSION: ICD-10-CM

## 2019-01-04 DIAGNOSIS — Z79.01 ANTICOAGULATION MANAGEMENT ENCOUNTER: ICD-10-CM

## 2019-01-04 DIAGNOSIS — I82.411 ACUTE DEEP VEIN THROMBOSIS (DVT) OF FEMORAL VEIN OF RIGHT LOWER EXTREMITY (H): Primary | ICD-10-CM

## 2019-01-04 DIAGNOSIS — I82.411 ACUTE DEEP VEIN THROMBOSIS (DVT) OF FEMORAL VEIN OF RIGHT LOWER EXTREMITY (H): ICD-10-CM

## 2019-01-04 DIAGNOSIS — D62 ANEMIA DUE TO BLOOD LOSS, ACUTE: ICD-10-CM

## 2019-01-04 PROCEDURE — 99316 NF DSCHRG MGMT 30 MIN+: CPT | Performed by: NURSE PRACTITIONER

## 2019-01-04 ASSESSMENT — MIFFLIN-ST. JEOR: SCORE: 1679.34

## 2019-01-04 NOTE — TELEPHONE ENCOUNTER
Katt, nurse at [facility] calling about pt  Pt discharging from TCU Sunday 1/6/2019  Pt needs INR checked Monday 1/7/20198 and needs someone to manage him     Reviewed with Katt that pt only see here one in the past year just for preop  Former [facility] patient  Looks like has been going to IM, Urology, and Cardiology providers at [facility] though  Asked if we are patient's primary   Katt states pt told her [facility] is her PCP    Told her pt needs to be seen by provider here at the clinic for hospital and TCU f/u  Is not an established INR patient with us yet  Needs referral, lab order, and visit with provider  Per below Katt states pt very unhappy and depressed - doesn't want to be seen     She is increasing pt to 6mg daily starting 1/4/2019  Previously on 5mg daily (had been therapeutic during TCU stay on this dose)    Has f/u with vascular surgery at [facility] 1/9/2018    At this point not sure who will manage INR or where pt wants to go  Will call pt Monday once out of TCU  Vannessa DEAN RN

## 2019-01-04 NOTE — PROGRESS NOTES
[facility] DISCHARGE SUMMARY    PATIENT'S NAME: Chepe Webster  YOB: 1950  MEDICAL RECORD NUMBER:  [id number]  Place of Service where encounter took place:  [facility] ([facility]) [[id number]]    PRIMARY CARE PROVIDER AND CLINIC RESPONSIBLE AFTER TRANSFER Petra Reynolds MD    TRANSFERRING PROVIDERS: Katt Guzmán, PETRA ACEVEDO, Jenn Wheat MD  DATE OF SNF ADMISSION:  December / 27 / 2018  DATE OF SNF (anticipated) DISCHARGE: January / 06 / 2019  DISCHARGE DISPOSITION: [facility] Provider   RECENT HOSPITALIZATION/ED:  Hospital  [facility] stay 12/17/2018 to 12/27/2018.     CODE STATUS/ADVANCE DIRECTIVES DISCUSSION:   CPR/Full code      Allergies   Allergen Reactions     No Known Allergies      Condition on Discharge:  Improving.  Function:  Mostly independent  Cognitive Scores: SLUMS 29/30    Equipment: walker    DISCHARGE DIAGNOSIS:   1. S/P femoral-popliteal bypass surgery    2. PAD (peripheral artery disease) (H)    3. Acute deep vein thrombosis (DVT) of femoral vein of right lower extremity (H)    4. Anticoagulation management encounter    5. Anemia due to blood loss, acute    6. Coronary artery disease involving native coronary artery of native heart without angina pectoris    7. Hyperlipidemia LDL goal <100    8. Essential hypertension    9. History of nephrolithiasis    10. Urinary retention    11. Scrotal edema    12. Primary insomnia    13. Chronic constipation        HPI Nursing Facility Course:  HPI information obtained from: facility chart records, facility staff, patient report and Care Everywhere Epic chart review.    S/P femoral-popliteal bypass surgery  PAD (peripheral artery disease) (H)- h/o multiple stents in right mid to distal SFA.   Patient transferred to TCU from hospital after treatment for RLE claudication. On 12/17 he underwent a right common femoral endarterectomy, right popliteal artery endarterectomy and femoral to popliteal bypass with in situ  saphenous vein graft. Post op issues include acute DVT of right femoral vein, urinary retention, scrotal swelling, and acute blood loss anemia.   Today he has very little pain. He is up and walking with a walker. The swelling has diminished.     Acute deep vein thrombosis (DVT) of femoral vein of right lower extremity (H)  Anticoagulation management encounter  Patient found to have acute DVT of right femoral vein on post op day two. He was started on heparin and warfarin on 12/20. Patient was offered suction thrombectomy by IR but he declined. He does have an appointment with Dr Hall on 1/9/19 at 3:20. He did go to see vascular surgery at [facility] for a second opinion. Dr Carmen recommended having the DVT removed and contacted Dr Hall.   INR goal 2-3 and should continue warfarin for 3-6 mos.   INR today 1.0. He had been therapeutic but level did drop today. We discussed the need to have INR checked on 1/7. I did increase the warfarin to 6mg q day. We will send him home with a few days of warfarin. Call placed to PCP to update  ABLA- hgb dropped to 7.0 from 9.4.on POD#3. He was transfused one unit PRBC. hgb was 12.3 in November 2016. No record of iron studies  Coronary artery disease involving native coronary artery of native heart without angina pectoris  Hyperlipidemia LDL goal <100  Hypertension  BP's: 102/64, 112/73, 135/76  s/p PCI to RCA with BMS on 12/30/12.   He continues on metoprolol, simvastatin.     History of nephrolithiasis- bilateral.   Urinary retention  Scrotal edema  Urology was consulted during this hospitalization due to urinary retention. flomax was started. Prior to transfer to TCU he was able to empty his bladder. He did develop scrotal edema during hospital stay. This has diminished since in TCU.     Primary insomnia  Takes trazodone. Melatonin - he states these meds are not helpful and does not want to take them any longer.    chronic constipation- patient reports for past 30 years he has bowel  "movement every 4 days. He states his bowels are working according to his schedule.  Recommendation made for colonoscopy due to fecal occult pos for blood.       PAST MEDICAL HISTORY:  has a past medical history of Other specified urticaria, Pure hypercholesterolemia, and Tobacco use disorder.    DISCHARGE MEDICATIONS:  Current Outpatient Medications   Medication Sig Dispense Refill     aspirin EC 81 MG EC tablet Take 81 mg by mouth daily        calcium carbonate (TUMS) 500 MG chewable tablet Take 1 chew tab by mouth 3 times daily as needed for heartburn       metoprolol tartrate (LOPRESSOR) 25 MG tablet Take 12.5 mg by mouth 2 times daily       simvastatin (ZOCOR) 40 MG tablet Take 40 mg by mouth At Bedtime        warfarin (COUMADIN) 4 MG tablet Take by mouth daily          MEDICATION CHANGES/RATIONALE:   none  Controlled medications sent with patient:   not applicable/none     ROS:    4 point ROS including Respiratory, CV, GI and , other than that noted in the HPI,  is negative    Physical Exam:   Vitals: /64   Pulse 94   Temp 99  F (37.2  C)   Resp 16   Ht 1.854 m (6' 1\")   Wt 86.5 kg (190 lb 12.8 oz)   SpO2 97%   BMI 25.17 kg/m    BMI= Body mass index is 25.17 kg/m .    GENERAL APPEARANCE:  Alert, in no distress  ENT:  Mouth and posterior oropharynx normal, moist mucous membranes, hearing acuity adequate   EYES:  EOM, conjunctivae, lids, pupils and irises normal  RESP:  respiratory effort and palpation of chest normal, no respiratory distress, Lung sounds clear  CV:  Palpation and auscultation of heart done , rate and rhythm reg, no murmur, no rub or gallop, Edema 1+ on right. Trace on left  ABDOMEN:  normal bowel sounds, soft, nontender, no hepatosplenomegaly or other masses  M/S:   Gait and station steady, Digits and nails normal   SKIN:  Inspection/Palpation of skin and subcutaneous tissue incisions on right thigh- staples intact. There is surrounding erythema.   NEURO: 2-12 in normal limits and " at patient's baseline  PSYCH:  insight and judgement, memory intact , affect and mood normal    DISCHARGE PLAN:  Declined home care services  Patient instructed to follow-up with:  PCP in 7 days  He does need an INR checked on 1/7    Current [facility] scheduled appointments:  No future appointments.    MTM referral needed and placed by this provider: No    Pending labs: none  SNF labs   CBC RESULTS:   Recent Labs   Lab Test 12/31/18 12/04/18  1134   WBC 6.7  --    RBC 2.96*  --    HGB 8.0* 10.5*   HCT 25.9*  --    MCV 88  --    MCH 27.0  --    MCHC 30.9*  --    RDW 19.4*  --    *  --        Last Basic Metabolic Panel:  Recent Labs   Lab Test 12/31/18 12/04/18  1134    137   POTASSIUM 4.1 4.3   CHLORIDE 103 104   AMA 8.3* 9.4   CO2 23 23   BUN 16 17   CR 0.93 0.99   * 96     TSH   Date Value Ref Range Status   04/15/2003 2.38 0.4 - 5.0 mU/L Final       Discharge Treatments:none    TOTAL DISCHARGE TIME:   Greater than 30 minutes  Electronically signed by:  PETRA Acuña CNP

## 2019-01-04 NOTE — TELEPHONE ENCOUNTER
Reason for Call:  Other call back    Detailed comments: Katt from [facility] transition care unit called and is sending PT home on Sunday 1/6. Told PT needs INR scheduled for 1/7/19 and pt refused to schedule.   Katt is worried about PT and wants to see if  someone from the clinic can call PT to see if he can schedule an INR apt.     Phone Number Patient can be reached at: PT number [phone]    Best Time: any    Can we leave a detailed message on this number? YES    Call taken on 1/4/2019 at 4:33 PM by Pinky Mcgrath

## 2019-01-07 ENCOUNTER — TELEPHONE (OUTPATIENT)
Dept: OTHER | Facility: CLINIC | Age: 69
End: 2019-01-07

## 2019-01-07 ENCOUNTER — TELEPHONE (OUTPATIENT)
Dept: FAMILY MEDICINE | Facility: CLINIC | Age: 69
End: 2019-01-07

## 2019-01-07 NOTE — TELEPHONE ENCOUNTER
CW,   See below message  Called pt this AM - he does not want to come in today  Scheduled for Wednesday to f/u with surgical team  Wants to see what they say first  Then he'll come in to f/u with us if needed  In the meantime, INR not being checked as it should be  Vannessa DEAN RN

## 2019-01-07 NOTE — TELEPHONE ENCOUNTER
"ED/Discharge Protocol    \"Hi, my name is Vannessa Perez, a registered nurse, and I am calling on behalf of Dr. Reynolds's office at [facility].  I am calling to follow up and see how things are going for you after your recent visit.\"    \"I see that you were in the (ER/UC/IP) on 12/27/2018-1/6/2018.    How are you doing now that you are home?\" patient states he's doing ok - see other TE from today - due for INR check - wants to wait and see surgeon Wednesday first then go from there - advised on importance of seeing Dr Reynolds and getting INR checked    Is patient experiencing symptoms that may require a hospital visit?  No    Discharge Instructions    \"Let's review your discharge instructions.  What is/are the follow-up recommendations?  Pt. Response: F/U with PCP    \"Were you instructed to make a follow-up appointment?\"  Pt. Response: No.       \"When you see the provider, I would recommend that you bring your discharge instructions with you.    Medications    \"How many new medications are you on since your hospitalization/ED visit?\"    0-1  \"How many of your current medicines changed (dose, timing, name, etc.) while you were in the hospital/ED visit?\"   0-1  \"Do you have questions about your medications?\"   No  \"Were you newly diagnosed with heart failure, COPD, diabetes or did you have a heart attack?\"   No  For patients on insulin: \"Did you start on insulin in the hospital or did you have your insulin dose changed?\"   No    Medication reconciliation completed? Yes    Was MTM referral placed (*Make sure to put transitions as reason for referral)?   No    Call Summary    \"Do you have any questions or concerns about your condition or care plan at the moment?\"    No  Triage nurse advice given: callback to schedule appt with PCP    Patient was in ER 9 times in the past year (assess appropriateness of ER visits.)      \"If you have questions or things don't continue to improve, we encourage you contact us through the main " "clinic number,  [phone].  Even if the clinic is not open, triage nurses are available 24/7 to help you.     We would like you to know that our clinic has extended hours (provide information).  We also have urgent care (provide details on closest location and hours/contact info)\"      \"Thank you for your time and take care!\"        "

## 2019-01-07 NOTE — TELEPHONE ENCOUNTER
Jim RN called me back and states that they were unaware of what is going on with Chepe.  I explained I spoke with Muriel DODSON last Wednesday about Chepe needing a thrombectomy/thrombolysis urgently and was assured by Muriel Mo would be contacted that day to get in with Dr. Hall to be seen.  Jim reports there is nothing in Chepe's chart regarding this and she will reach out to him today and let him know it was communicated and will be addressed at his visit this Wednesday.    Dorita Roman BSN, RN

## 2019-01-07 NOTE — TELEPHONE ENCOUNTER
I called [facility] scheduling and they stated patient is scheduled on Wednesday Jan.9 2019. I requested to speak with a vascular nurse to clarify the urgency of Chepe being seen and was unable to get connected but was told a message would be sent to be contacted today.     I called Chepe and he stated the appointment for Wednesday was already made prior to seeing Dr. Carmen and he has not been contacted by Dr. Hall's clinic at all.  I explained to Chepe at this point the options are to keep his appointment for Wednesday with Dr. Hall or to get scheduled here at [place] due to time constraints. Chepe stated he would like to keep his appointment with Dr. Hall and will be contacting us if there is any questions at his Wednesday appointment. I stressed the importance of completing the mechanical thrombectomy/thrombolysis very soon and he understands.    Awaiting a call back from Dr. Hall's     Dorita HATRN, RN

## 2019-01-07 NOTE — TELEPHONE ENCOUNTER
Thank you so much Vannessa for your follow-up for him!  Signed order for INR referral for monitoring/management.  Do not see that he was on lovenox at [facility], so wouldn't start from here/now with our limited knowledge of his situation, and will not be able to continue to manage his situation via phone- hopefully he will make that appt here very soon.  Accidentally closed encounter- apologies.  Will need to addend if further comments.  Thank you!  CW

## 2019-01-07 NOTE — TELEPHONE ENCOUNTER
"Received a call from Dr Reynolds about this patient   She would like to see him next week for a 45 minute appointment  Also wonders if he can come in today to get INR or get INR at surgical f/u appt Wednesday  Also is he still on Lovenox?    Called patient   Discussed with him the importance of having INR today (again)  He is against coming   States it's a \"colossal hassle\" to get here  States he has a big blood clot, doesn't want to go anywhere  Also doesn't want anyone in his home as far as homecare    No longer on Lovenox - d/c'd before leaving TCU  No sure if you want pt back on Lovenox?  Surgeon should manage this!  He won't leave home to get Rx anyway  States \"If I die I won't blame you guys\"    Pt will callback to schedule appt with PCP    I called over to [facility] where pt will be seen Wednesday   sent a message to Dr Hall to see if INR can be drawn there   staff said it shouldn't be an issue  They will then call INR result to us    Dr Reynolds: Need INR referral placed at least to start managing his INR - pended this for you    "

## 2019-01-07 NOTE — TELEPHONE ENCOUNTER
Received a call from the patient stating he still not has heard from [facility] about getting set up for the procedure that Dr. Carmen wanted him to have. Informed him I would send a message into Dr. Carmen's nurse.

## 2019-01-09 ENCOUNTER — TELEPHONE (OUTPATIENT)
Dept: FAMILY MEDICINE | Facility: CLINIC | Age: 69
End: 2019-01-09
Payer: MEDICARE

## 2019-01-09 DIAGNOSIS — I82.411 ACUTE DEEP VEIN THROMBOSIS (DVT) OF FEMORAL VEIN OF RIGHT LOWER EXTREMITY (H): Primary | ICD-10-CM

## 2019-01-09 LAB — INR POINT OF CARE: 4.5 (ref 0.86–1.14)

## 2019-01-09 PROCEDURE — 85610 PROTHROMBIN TIME: CPT | Mod: QW | Performed by: FAMILY MEDICINE

## 2019-01-09 PROCEDURE — 99207 ZZC NO CHARGE NURSE ONLY: CPT | Performed by: FAMILY MEDICINE

## 2019-01-09 PROCEDURE — 36416 COLLJ CAPILLARY BLOOD SPEC: CPT | Performed by: FAMILY MEDICINE

## 2019-01-09 NOTE — TELEPHONE ENCOUNTER
To Vannessa RN (she is aware of this patient)      I was on call and received a call from Dr. Hall - [facility]  He had INR today of 4.5   Will have him hold dose tonight.  Sounds like he was on 6mg since Jan 4th but 5mg prior to that time but had surgery fem-pop and post op DVT.    He is difficulty -   Some confusion about where he wants to be followed.   He gets care at [facility] but not with the INR clinic at [facility] because his PCP ( Dr. De Leon) is not in the [facility] system.    Please restart back on 5mg and advised when to recheck INR    Also he needs figure out if he is going to follow-up here or at [facility] but NEEDS TO FOLLOW-UP for recheck and dosing.   [facility] INR clinic requires him to transfer care to PCP in that system.    Call me Thursday if any questions    Thanks  PN

## 2019-01-10 ENCOUNTER — NURSE TRIAGE (OUTPATIENT)
Dept: NURSING | Facility: CLINIC | Age: 69
End: 2019-01-10

## 2019-01-10 ENCOUNTER — TELEPHONE (OUTPATIENT)
Dept: FAMILY MEDICINE | Facility: CLINIC | Age: 69
End: 2019-01-10

## 2019-01-10 RX ORDER — WARFARIN SODIUM 2 MG/1
2 TABLET ORAL DAILY
Qty: 30 TABLET | Refills: 0 | Status: SHIPPED | OUTPATIENT
Start: 2019-01-10 | End: 2019-01-16

## 2019-01-10 NOTE — TELEPHONE ENCOUNTER
ANTICOAGULATION FOLLOW-UP CLINIC VISIT    Patient Name:  Chepe Webster  Date:  1/10/2019  Contact Type:  Telephone    SUBJECTIVE:  Bleeding Signs/Symptoms: None  Thromboembolic Signs/Symptoms: None    Medication Changes:  No  Dietary Changes:  No  Bacterial/Viral Infection:  No    Missed Coumadin Doses:  None  Other Concerns:  No      ASSESSMENT/PLAN:  See: ANTICOAGULATION QIC flow sheet.    INR 4.5 yesterday  PN dosed pt last night  Called pt - he confirmed he held last night's dose  Will take 5mg daily  Recheck next Wed - scheduled him with PCP  He wants to continue seeing Dr Reynolds and have her manage INR  Pt states Dr Hall only gave him 3mg Warfarin tabs yesterday   Needed 2mg Rx sent in  Rx needs to be manually faxed to pharmacy - will have JS print in CW's absence  Vannessa DEAN RN    Dosage adjustment made based on physician directed care plan.    KAILYN REYNOLDS

## 2019-01-10 NOTE — TELEPHONE ENCOUNTER
Patient calling to say that his prescription for Warfarin did not go to [place] [facility] [place] as requested.    Patient is disabled and got a ride to the pharmacy by the prescription was not there. Will be unable to take correct dose tonight as he does not have the medication. Patient states he will be unable to get the medication tonight. He no longer has a ride.    Protocol and care advice reviewed  Caller states understanding of the recommended disposition. Will send message to clinic for refill to be sent tomorrow.    Advised to call back if further questions or concerns      Reason for Disposition    [1] Prescription not at pharmacy AND [2] was prescribed today by PCP    Protocols used: MEDICATION QUESTION CALL-ADULT-

## 2019-01-10 NOTE — TELEPHONE ENCOUNTER
Clinic Action Needed: Yes    [facility] Triage Call  Presenting Problem:    Patient calling to say that his prescription for Warfarin did not go to the [facility] pharmacy as requested.    Patient is disabled and got a ride to the pharmacy but the prescription was not there. Will be unable to take correct dose tonight as he does not have the medication. Patient states he will be unable to get the medication tonight. He no longer has a ride.    Please notify patient when medication has been sent to pharmacy for fill.    Routed to: Dr Hurtado/Nurse Galan

## 2019-01-10 NOTE — TELEPHONE ENCOUNTER
JS,   Patient needs Warfarin 2mg tabs to take with 3mg for total of 5mg daily  Please print Rx - unable to eRx to pharmacy he requested  Wants #30 tabs - cheaper    CW,   Andrew, pt scheduled hospital f/u with you for next week  See below - INR now [facility]  Scheduled him in 30 min spot  Know you wanted 45 min appt but no spots next week  Pt had refused to schedule with you when I talked to him earlier this week  Did tell him to arrive at 2pm for 215pm appt  Vannessa DEAN RN

## 2019-01-11 NOTE — TELEPHONE ENCOUNTER
Called [facility] and gave them verbal order for Warfarin  Pt informed  Vannessa DEAN RN

## 2019-01-16 ENCOUNTER — TELEPHONE (OUTPATIENT)
Dept: FAMILY MEDICINE | Facility: CLINIC | Age: 69
End: 2019-01-16
Payer: MEDICARE

## 2019-01-16 ENCOUNTER — OFFICE VISIT (OUTPATIENT)
Dept: FAMILY MEDICINE | Facility: CLINIC | Age: 69
End: 2019-01-16
Payer: MEDICARE

## 2019-01-16 VITALS
TEMPERATURE: 98.3 F | WEIGHT: 178.06 LBS | DIASTOLIC BLOOD PRESSURE: 82 MMHG | HEART RATE: 75 BPM | BODY MASS INDEX: 23.49 KG/M2 | OXYGEN SATURATION: 98 % | SYSTOLIC BLOOD PRESSURE: 136 MMHG

## 2019-01-16 DIAGNOSIS — I73.9 PAD (PERIPHERAL ARTERY DISEASE) (H): Primary | ICD-10-CM

## 2019-01-16 DIAGNOSIS — Z79.01 ANTICOAGULATED ON COUMADIN: ICD-10-CM

## 2019-01-16 DIAGNOSIS — Z95.828 S/P FEMORAL-POPLITEAL BYPASS SURGERY: ICD-10-CM

## 2019-01-16 DIAGNOSIS — I82.411 ACUTE DEEP VEIN THROMBOSIS (DVT) OF FEMORAL VEIN OF RIGHT LOWER EXTREMITY (H): ICD-10-CM

## 2019-01-16 DIAGNOSIS — Z12.11 COLON CANCER SCREENING: ICD-10-CM

## 2019-01-16 DIAGNOSIS — N50.89 SCROTAL EDEMA: ICD-10-CM

## 2019-01-16 LAB
ERYTHROCYTE [DISTWIDTH] IN BLOOD BY AUTOMATED COUNT: 21.3 % (ref 10–15)
HCT VFR BLD AUTO: 34.9 % (ref 40–53)
HGB BLD-MCNC: 10.4 G/DL (ref 13.3–17.7)
INR POINT OF CARE: 2.4 (ref 0.86–1.14)
MCH RBC QN AUTO: 26.9 PG (ref 26.5–33)
MCHC RBC AUTO-ENTMCNC: 29.8 G/DL (ref 31.5–36.5)
MCV RBC AUTO: 90 FL (ref 78–100)
PLATELET # BLD AUTO: 382 10E9/L (ref 150–450)
RBC # BLD AUTO: 3.86 10E12/L (ref 4.4–5.9)
WBC # BLD AUTO: 5.3 10E9/L (ref 4–11)

## 2019-01-16 PROCEDURE — 80048 BASIC METABOLIC PNL TOTAL CA: CPT | Performed by: FAMILY MEDICINE

## 2019-01-16 PROCEDURE — 85027 COMPLETE CBC AUTOMATED: CPT | Performed by: FAMILY MEDICINE

## 2019-01-16 PROCEDURE — 85610 PROTHROMBIN TIME: CPT | Mod: QW | Performed by: FAMILY MEDICINE

## 2019-01-16 PROCEDURE — 99214 OFFICE O/P EST MOD 30 MIN: CPT | Performed by: FAMILY MEDICINE

## 2019-01-16 PROCEDURE — 36415 COLL VENOUS BLD VENIPUNCTURE: CPT | Performed by: FAMILY MEDICINE

## 2019-01-16 PROCEDURE — 99207 ZZC NO CHARGE NURSE ONLY: CPT | Performed by: FAMILY MEDICINE

## 2019-01-16 RX ORDER — WARFARIN SODIUM 2 MG/1
2 TABLET ORAL DAILY
Qty: 90 TABLET | Refills: 3 | Status: SHIPPED | OUTPATIENT
Start: 2019-01-16 | End: 2019-04-01

## 2019-01-16 NOTE — PROGRESS NOTES
SUBJECTIVE:   Chepe Webster is a 68 year old male who presents to clinic today for the following health issues:      Hospital Follow-up Visit:    Hospital/Nursing Home/IP Rehab Facility: [facility]  Date of Admission: 12/18/2018  Date of Discharge: 12/27/2018  Reason(s) for Admission: pain             Problems taking medications regularly:  None       Medication changes since discharge: warfin       Problems adhering to non-medication therapy:  None    FEM-POP bypass after failed stents x 5.  Leg clot complication in his same/R leg.  They rec another procedure, but pt declined.  Got second opinion at [facility].    R foot/leg still swollen, and still has some pain where he has the stitches, and in his foot.  He does think it's getting better some.   He also had scrotal edema, but that has improved as well.  Told take tylenol, which helps some.    Wt loss since U, but now back to his normal weight- thinks he lost a lot water weight.  Eating well.    He's been taking iron every other day for the past 10 days- since he got home from the U.    Colonoscopy f/u was mentioned at hospital discharge summary, due to positive fecalcult.  He last did a FIT test in '15, no h/o colonoscopies.  Not interested in scope, will consider FIT.    Coumadin monitoring - last INR 4.5 on 1/9/18 (checked at [facility]  Skipped a dose, and has been taking 5mg daily since that time.      Summary of hospitalization:  [facility] discharge summary reviewed    Incidental Findings: None  Operations/Procedures:   Date: 12/17/18 Procedure: Right common femoral and popliteal endarterectomies, right fem-pop bypass with in situ saphenous vein graft   HOSPITAL COURSE:   69 y/o male admitted post-operatively for right common femoral and popliteal endarterectomies, right fem-pop bypass with in situ saphenous vein graft on 12/17/2018. Did well in terms of pain control and tolerating diet post-op. Failed trial of voiding on POD#1, macias replaced by  urology and patient started on daily flomax. TOV on 12/24, which was successful. F/u with urology, Dr Sandoval as outpatient.   Scrotal swelling; low protein studies, being followed by urology.   Acute DVT of right lower extremity noted on POD #2 after ultrasound evaluation. Patient anti-coagulated on heparin drip and warfarin intitiated on 12/20. Offered suction thrombectomy by IR, patient preferred medical management. 1 unit of pRBC given on POD#3 for Hg of 7.0 on anti-coagulation, patient remained asymptomatic.   Chronic constipation at baseline (usually q4-5 days) and refused most bowel meds here, but became more amendable throughout his stay. BM on 12/24 and 12/26; history of chronic constipation and hemorrhoids, fecal occult positive- recommending screening colonoscopy to be discussed with PCP as outpatient due to therapeutic anticoagulation for next several months.   Graft and PT on right remain dopplerable. Plan for warfarin for a minimum of 3-6 months of treatment for acute DVT, transitioned to therapeutic lovenox bridging as preparation for dc.   Worked with physical therapy while inpatient, recommended KENNEDY placement for ongoing therapy, working well with front wheel walker.   PENDING TESTS RESULTS: None                  Diagnostic Tests/Treatments reviewed.  Follow up needed: none  Other Healthcare Providers Involved in Patient s Care:         Specialist appointment - vascular surgery  Update since discharge: improved.     Post Discharge Medication Reconciliation: discharge medications reconciled, continue medications without change.  Plan of care communicated with patient     Coding guidelines for this visit:  Type of Medical   Decision Making Face-to-Face Visit       within 7 Days of discharge Face-to-Face Visit        within 14 days of discharge   Moderate Complexity 65389 [id number]   High Complexity 42181 [id number]            Problem list and histories reviewed & adjusted, as indicated.  Additional history:  as documented      Patient Active Problem List   Diagnosis     Coronary artery disease involving native coronary artery of native heart without angina pectoris     Hyperlipidemia LDL goal <100     Smoking     PAD (peripheral artery disease) (H)     Urticaria     History of nephrolithiasis     Primary insomnia     S/P femoral-popliteal bypass surgery     Acute deep vein thrombosis (DVT) of femoral vein of right lower extremity (H)     Anticoagulation management encounter     Urinary retention     Scrotal edema     Essential hypertension     Past Surgical History:   Procedure Laterality Date     AS REVASCULARIZE FEM/POP ARTERY, ANGIOPLASTY/STENT      multiple in '15 and      CYSTOSCOPY      multiple      HC URETERAL STENT REPOSITIONING Left 2016       Social History     Tobacco Use     Smoking status: Former Smoker     Types: Cigars     Last attempt to quit: 2018     Years since quittin.0     Smokeless tobacco: Never Used     Tobacco comment: - down to cigars twice a week   Substance Use Topics     Alcohol use: No     Family History   Problem Relation Age of Onset     Unknown/Adopted Mother      Unknown/Adopted Father      Diabetes No family hx of      Coronary Artery Disease No family hx of      Hypertension No family hx of      Hyperlipidemia No family hx of      Cerebrovascular Disease No family hx of      Breast Cancer No family hx of      Colon Cancer No family hx of      Prostate Cancer No family hx of      Other Cancer No family hx of      Depression No family hx of      Anxiety Disorder No family hx of      Mental Illness No family hx of      Substance Abuse No family hx of      Anesthesia Reaction No family hx of      Asthma No family hx of      Osteoporosis No family hx of      Genetic Disorder No family hx of      Thyroid Disease No family hx of      Obesity No family hx of          Current Outpatient Medications   Medication Sig Dispense Refill     aspirin EC 81 MG EC tablet Take 81  mg by mouth daily        metoprolol tartrate (LOPRESSOR) 25 MG tablet Take 12.5 mg by mouth 2 times daily       simvastatin (ZOCOR) 40 MG tablet Take 40 mg by mouth At Bedtime        warfarin (COUMADIN) 2 MG tablet Take 1 tablet (2 mg) by mouth daily with 3mg tab for total of 5mg daily or as advised by INR clinic 90 tablet 3     calcium carbonate (TUMS) 500 MG chewable tablet Take 1 chew tab by mouth 3 times daily as needed for heartburn       Allergies   Allergen Reactions     No Known Allergies      Recent Labs   Lab Test 01/16/19  1525 12/31/18 01/07/13   LDL  --   --   --  90   HDL  --   --   --  52   TRIG  --   --   --  96   CR 0.91 0.93   < >  --    GFRESTIMATED 85 62   < >  --    GFRESTBLACK >90 72   < >  --    POTASSIUM 4.6 4.1   < >  --     < > = values in this interval not displayed.      BP Readings from Last 3 Encounters:   01/16/19 136/82   01/04/19 102/64   01/02/19 119/68    Wt Readings from Last 3 Encounters:   01/16/19 80.8 kg (178 lb 1 oz)   01/04/19 86.5 kg (190 lb 12.8 oz)   01/02/19 87.8 kg (193 lb 9.6 oz)           Labs reviewed in EPIC    Reviewed and updated as needed this visit by clinical staff  Tobacco  Allergies  Meds  Problems  Med Hx  Surg Hx  Fam Hx       Reviewed and updated as needed this visit by Provider  Tobacco  Allergies  Meds  Problems  Med Hx  Surg Hx  Fam Hx         ROS:  Constitutional, HEENT, cardiovascular, pulmonary, gi and gu systems are negative, except as otherwise noted.    OBJECTIVE:     /82   Pulse 75   Temp 98.3  F (36.8  C) (Oral)   Wt 80.8 kg (178 lb 1 oz)   SpO2 98%   BMI 23.49 kg/m    Body mass index is 23.49 kg/m .  GENERAL APPEARANCE: healthy, alert and no distress     EYES: PERRL, sclera clear     HENT: nose and mouth without ulcers or lesions     NECK: no adenopathy, no asymmetry, masses, or scars and thyroid normal to palpation     RESP: lungs clear to auscultation - no rales, rhonchi or wheezes     CV: regular rates and rhythm,  normal S1 S2, no S3 or S4 and no murmur, click or rub      Abdomen: soft, nontender, no HSM or masses and bowel sounds normal     Ext: warm, dry, trace/1+ edema of left leg, 2+ edema of R leg, extensive sutures on medial right leg (bandaged) and staples up R thigh to groin area, scrotal edema appears resolved        Diagnostic Test Results:  Results for orders placed or performed in visit on 01/16/19   CBC with platelets   Result Value Ref Range    WBC 5.3 4.0 - 11.0 10e9/L    RBC Count 3.86 (L) 4.4 - 5.9 10e12/L    Hemoglobin 10.4 (L) 13.3 - 17.7 g/dL    Hematocrit 34.9 (L) 40.0 - 53.0 %    MCV 90 78 - 100 fl    MCH 26.9 26.5 - 33.0 pg    MCHC 29.8 (L) 31.5 - 36.5 g/dL    RDW 21.3 (H) 10.0 - 15.0 %    Platelet Count 382 150 - 450 10e9/L   Basic metabolic panel  (Ca, Cl, CO2, Creat, Gluc, K, Na, BUN)   Result Value Ref Range    Sodium 135 133 - 144 mmol/L    Potassium 4.6 3.4 - 5.3 mmol/L    Chloride 106 94 - 109 mmol/L    Carbon Dioxide 19 (L) 20 - 32 mmol/L    Anion Gap 10 3 - 14 mmol/L    Glucose 109 (H) 70 - 99 mg/dL    Urea Nitrogen 16 7 - 30 mg/dL    Creatinine 0.91 0.66 - 1.25 mg/dL    GFR Estimate 85 >60 mL/min/[1.73_m2]    GFR Estimate If Black >90 >60 mL/min/[1.73_m2]    Calcium 9.2 8.5 - 10.1 mg/dL       ASSESSMENT/PLAN:       ICD-10-CM    1. PAD (peripheral artery disease) (H) I73.9 CBC with platelets     Basic metabolic panel  (Ca, Cl, CO2, Creat, Gluc, K, Na, BUN)   2. S/P femoral-popliteal bypass surgery Z95.828    3. Acute deep vein thrombosis (DVT) of femoral vein of right lower extremity (H) I82.411 warfarin (COUMADIN) 2 MG tablet     CBC with platelets     Basic metabolic panel  (Ca, Cl, CO2, Creat, Gluc, K, Na, BUN)     INR point of care   4. Anticoagulated on Coumadin Z51.81     Z79.01    5. Scrotal edema N50.89 CBC with platelets     Basic metabolic panel  (Ca, Cl, CO2, Creat, Gluc, K, Na, BUN)   6. Colon cancer screening Z12.11 Fecal colorectal cancer screen (FIT)     --Hospital/TCU f/u s/p  fem-pop bypass (12/17/18) and R leg DVT complication post-op.  He declined the recommended procedure for the DVT, and was happy to see that it had mostly resolved.  --He is now out from TCU stay x 10 days.  Pain and mobility improving, but still with pain in stitches/staples area, still with some swelling in R leg.  --On coumadin for DVT- recs for 3-6 months.  Pt would like to be seen here for INR/coumadin management- [place] [facility] [place] (and decision on when to stop the coumadin).  Okay to continue INR management here.  --INR 2.7mg/d today.  Rec 5mg daily with recheck in a week.  Sent in 2mg tabs (#90) as pt wanted a larger supply- will likely send in 5mg tabs if dosing stabilizes to around this dose.  --Most f/u will be with [place] [facility] but rec f/u here in ~4 months if doing fine, sooner if issues/concerns.      Return in about 3 months (around 4/16/2019) for Routine Visit/Chronic Cares.      Petra Reynolds MD  [facility]

## 2019-01-16 NOTE — NURSING NOTE
"Chief Complaint   Patient presents with     Hospital F/U     /82   Pulse 75   Temp 98.3  F (36.8  C) (Oral)   Wt 80.8 kg (178 lb 1 oz)   SpO2 98%   BMI 23.49 kg/m   Estimated body mass index is 23.49 kg/m  as calculated from the following:    Height as of 1/4/19: 1.854 m (6' 1\").    Weight as of this encounter: 80.8 kg (178 lb 1 oz).  bp completed using cuff size: regular      Health Maintenance addressed:  NONE    n/a              "

## 2019-01-16 NOTE — LETTER
January 17, 2019      Chepe Webster  [address]  [place] [facility] [address]        Dear ,    We are writing to inform you of your test results.    -Your basic metabolic panel (which includes electrolyte levels, blood sugar level and kidney function tests) looks okay, with a higher glucose which is okay since you were not fasting for the lab draw.  -Your CBC labs (which includes blood labs looking for signs of infection or anemia) that you are still anemic, but your hemoglobin has improved a bit from 8.0 to 10.4.  We should check this again at your next office appointment.    Resulted Orders   CBC with platelets   Result Value Ref Range    WBC 5.3 4.0 - 11.0 10e9/L    RBC Count 3.86 (L) 4.4 - 5.9 10e12/L    Hemoglobin 10.4 (L) 13.3 - 17.7 g/dL    Hematocrit 34.9 (L) 40.0 - 53.0 %    MCV 90 78 - 100 fl    MCH 26.9 26.5 - 33.0 pg    MCHC 29.8 (L) 31.5 - 36.5 g/dL    RDW 21.3 (H) 10.0 - 15.0 %    Platelet Count 382 150 - 450 10e9/L   Basic metabolic panel  (Ca, Cl, CO2, Creat, Gluc, K, Na, BUN)   Result Value Ref Range    Sodium 135 133 - 144 mmol/L    Potassium 4.6 3.4 - 5.3 mmol/L    Chloride 106 94 - 109 mmol/L    Carbon Dioxide 19 (L) 20 - 32 mmol/L    Anion Gap 10 3 - 14 mmol/L    Glucose 109 (H) 70 - 99 mg/dL    Urea Nitrogen 16 7 - 30 mg/dL    Creatinine 0.91 0.66 - 1.25 mg/dL    GFR Estimate 85 >60 mL/min/[1.73_m2]      Comment:      Non  GFR Calc  Starting 12/18/2018, serum creatinine based estimated GFR (eGFR) will be   calculated using the Chronic Kidney Disease Epidemiology Collaboration   (CKD-EPI) equation.      GFR Estimate If Black >90 >60 mL/min/[1.73_m2]      Comment:       GFR Calc  Starting 12/18/2018, serum creatinine based estimated GFR (eGFR) will be   calculated using the Chronic Kidney Disease Epidemiology Collaboration   (CKD-EPI) equation.      Calcium 9.2 8.5 - 10.1 mg/dL       If you have any questions or concerns, please call the  clinic at the number listed above.       Sincerely,        Petra Reynolds MD

## 2019-01-16 NOTE — TELEPHONE ENCOUNTER
Huddled with CW, called pt and instructed to take 5 mg daily and recheck in 1 week. Scheduled lab appointment.

## 2019-01-17 LAB
ANION GAP SERPL CALCULATED.3IONS-SCNC: 10 MMOL/L (ref 3–14)
BUN SERPL-MCNC: 16 MG/DL (ref 7–30)
CALCIUM SERPL-MCNC: 9.2 MG/DL (ref 8.5–10.1)
CHLORIDE SERPL-SCNC: 106 MMOL/L (ref 94–109)
CO2 SERPL-SCNC: 19 MMOL/L (ref 20–32)
CREAT SERPL-MCNC: 0.91 MG/DL (ref 0.66–1.25)
GFR SERPL CREATININE-BSD FRML MDRD: 85 ML/MIN/{1.73_M2}
GLUCOSE SERPL-MCNC: 109 MG/DL (ref 70–99)
POTASSIUM SERPL-SCNC: 4.6 MMOL/L (ref 3.4–5.3)
SODIUM SERPL-SCNC: 135 MMOL/L (ref 133–144)

## 2019-01-17 NOTE — TELEPHONE ENCOUNTER
ANTICOAGULATION FOLLOW-UP CLINIC VISIT    Patient Name:  Chepe Webster  Date:  1/17/2019  Contact Type:  Telephone    SUBJECTIVE:  Bleeding Signs/Symptoms: None  Thromboembolic Signs/Symptoms: None    Medication Changes:  No  Dietary Changes:  No  Bacterial/Viral Infection:  No    Missed Coumadin Doses:  This week - 2 days  Other Concerns:  No      ASSESSMENT/PLAN:  See: ANTICOAGULATION QIC flow sheet.    INR 2.4   Continue 5 mg daily recheck in 1 week.    Dosage adjustment made based on physician directed care plan.    KAILYN BAY

## 2019-01-17 NOTE — RESULT ENCOUNTER NOTE
Please send letter below with copy of results.  Thanks! CW    Here are your lab results from your recent visit...  -Your basic metabolic panel (which includes electrolyte levels, blood sugar level and kidney function tests) looks okay, with a higher glucose which is okay since you were not fasting for the lab draw.  -Your CBC labs (which includes blood labs looking for signs of infection or anemia) that you are still anemic, but your hemoglobin has improved a bit from 8.0 to 10.4.  We should check this again at your next office appointment.      Please let me know if you have any questions.  Best,   Gabriel Reynolds MD

## 2019-01-23 ENCOUNTER — TELEPHONE (OUTPATIENT)
Dept: FAMILY MEDICINE | Facility: CLINIC | Age: 69
End: 2019-01-23

## 2019-01-23 ENCOUNTER — APPOINTMENT (OUTPATIENT)
Dept: LAB | Facility: CLINIC | Age: 69
End: 2019-01-23
Payer: MEDICARE

## 2019-01-23 DIAGNOSIS — I82.411 ACUTE DEEP VEIN THROMBOSIS (DVT) OF FEMORAL VEIN OF RIGHT LOWER EXTREMITY (H): ICD-10-CM

## 2019-01-23 LAB — INR POINT OF CARE: 3.5 (ref 0.86–1.14)

## 2019-01-23 PROCEDURE — 85610 PROTHROMBIN TIME: CPT | Mod: QW | Performed by: FAMILY MEDICINE

## 2019-01-23 PROCEDURE — 36416 COLLJ CAPILLARY BLOOD SPEC: CPT | Performed by: FAMILY MEDICINE

## 2019-01-23 NOTE — TELEPHONE ENCOUNTER
"Was in lab when patient had INR checked  Patient states \"I've been waiting all day to come in and do this\" then held up his middle finger to lab staff and laughed  Told Dr Reynolds about this as it's inappropriate behavior to staff  She will continue to monitor and talk to pt if needed about behavior  Vannessa DEAN RN    "

## 2019-01-23 NOTE — TELEPHONE ENCOUNTER
ANTICOAGULATION FOLLOW-UP CLINIC VISIT    Patient Name:  Chepe Webster  Date:  1/23/2019  Contact Type:  Face to Face    SUBJECTIVE:  Bleeding Signs/Symptoms: None  Thromboembolic Signs/Symptoms: None    Medication Changes:  No  Dietary Changes:  No  Bacterial/Viral Infection:  No    Missed Coumadin Doses:  None  Other Concerns:  No      ASSESSMENT/PLAN:  See: ANTICOAGULATION QIC flow sheet.    INR 3.5   Take 2.5mg today then 5mg ROW  Recheck 1 week   Patient informed of instructions  Vannessa DEAN RN    Dosage adjustment made based on physician directed care plan.    KAILYN BAY

## 2019-01-30 ENCOUNTER — APPOINTMENT (OUTPATIENT)
Dept: LAB | Facility: CLINIC | Age: 69
End: 2019-01-30
Payer: MEDICARE

## 2019-01-30 ENCOUNTER — TELEPHONE (OUTPATIENT)
Dept: FAMILY MEDICINE | Facility: CLINIC | Age: 69
End: 2019-01-30

## 2019-01-30 DIAGNOSIS — I82.411 ACUTE DEEP VEIN THROMBOSIS (DVT) OF FEMORAL VEIN OF RIGHT LOWER EXTREMITY (H): ICD-10-CM

## 2019-01-30 LAB — INR POINT OF CARE: 3.2 (ref 0.86–1.14)

## 2019-01-30 PROCEDURE — 85610 PROTHROMBIN TIME: CPT | Mod: QW | Performed by: FAMILY MEDICINE

## 2019-01-30 PROCEDURE — 36416 COLLJ CAPILLARY BLOOD SPEC: CPT | Performed by: FAMILY MEDICINE

## 2019-01-30 PROCEDURE — 99207 ZZC NO CHARGE NURSE ONLY: CPT | Performed by: FAMILY MEDICINE

## 2019-01-30 NOTE — TELEPHONE ENCOUNTER
ANTICOAGULATION FOLLOW-UP CLINIC VISIT    Patient Name:  Chepe Webster  Date:  1/30/2019  Contact Type:  Telephone    SUBJECTIVE:  Bleeding Signs/Symptoms: None  Thromboembolic Signs/Symptoms: None    Medication Changes:  No  Dietary Changes:  No  Bacterial/Viral Infection:  Yes: Patient is on Augmentin for wound    Missed Coumadin Doses:  None  Other Concerns:  No      ASSESSMENT/PLAN:  See: ANTICOAGULATION QIC flow sheet.    INR 3.2   Continue 2.5mg W and 5mg ROW  See above - pt on Augmentin   Started 1/28/2019 (major interacter)   Abx has not affected INR though - will keep pt on same dosing and recheck Monday (5 days)   Pt informed of instructions by IVORY Ruby RN       Dosage adjustment made based on physician directed care plan.    KAILYN BAY

## 2019-01-30 NOTE — TELEPHONE ENCOUNTER
Called number on INR questionnaire.  Daughter Jeannette's cell  ([phone]).    States patient had appointment at [facility] so he gave us her cell.  When she speaks with patient she will have him call clinic

## 2019-01-30 NOTE — TELEPHONE ENCOUNTER
INR 3.2  Attempt to reach patient.  No answer. No VM option.  Will try to call later.  Jacquelyn Ch RN

## 2019-02-01 ENCOUNTER — TELEPHONE (OUTPATIENT)
Dept: FAMILY MEDICINE | Facility: CLINIC | Age: 69
End: 2019-02-01

## 2019-02-01 NOTE — TELEPHONE ENCOUNTER
Spoke with patient.  States he will take his last dose of Amoxicillin Monday 2/3  Has 2 other appointments on Monday 2/3.  Wanting to change lab appt.  Scheduled patient for INR Wed. 2/6 at 10:15 am.  Jacquelyn Ch RN

## 2019-02-01 NOTE — TELEPHONE ENCOUNTER
Reason for Call:  Appointment needed?    Detailed comments: Pt called and wanted to know if their scheduled INR appoitment on Mon 2/4 is needed because of antibiotic medication changes. Pt would like a call back for clarification.      Phone Number Patient can be reached at: Home number on file [phone] (home)    Best Time: Any     Can we leave a detailed message on this number? YES    Call taken on 2/1/2019 at 9:38 AM by Ilda Oswald

## 2019-02-06 ENCOUNTER — TELEPHONE (OUTPATIENT)
Dept: FAMILY MEDICINE | Facility: CLINIC | Age: 69
End: 2019-02-06

## 2019-02-06 ENCOUNTER — APPOINTMENT (OUTPATIENT)
Dept: LAB | Facility: CLINIC | Age: 69
End: 2019-02-06
Payer: MEDICARE

## 2019-02-06 DIAGNOSIS — I82.411 ACUTE DEEP VEIN THROMBOSIS (DVT) OF FEMORAL VEIN OF RIGHT LOWER EXTREMITY (H): ICD-10-CM

## 2019-02-06 LAB — INR POINT OF CARE: 2.5 (ref 0.86–1.14)

## 2019-02-06 PROCEDURE — 99207 ZZC NO CHARGE NURSE ONLY: CPT | Performed by: FAMILY MEDICINE

## 2019-02-06 PROCEDURE — 36416 COLLJ CAPILLARY BLOOD SPEC: CPT | Performed by: FAMILY MEDICINE

## 2019-02-06 PROCEDURE — 85610 PROTHROMBIN TIME: CPT | Mod: QW | Performed by: FAMILY MEDICINE

## 2019-02-06 NOTE — TELEPHONE ENCOUNTER
ANTICOAGULATION FOLLOW-UP CLINIC VISIT    Patient Name:  Chepe Webster  Date:  2/6/2019  Contact Type:  Telephone. Dose instructions given to patient    SUBJECTIVE:  Bleeding Signs/Symptoms: None  Thromboembolic Signs/Symptoms: None    Medication Changes:  No  Dietary Changes:  No  Bacterial/Viral Infection:  No    Missed Coumadin Doses:  None  Other Concerns:  No      ASSESSMENT/PLAN:  See: ANTICOAGULATION QIC flow sheet.    INR 2.5  Plan: Continue 2.5 mg W and 5 mg rest of week = 32.5 mg/wk. Recheck INR in 2 weeks.  Jacquelyn Ch RN      Dosage adjustment made based on physician directed care plan.    KAILYN BAY

## 2019-02-20 ENCOUNTER — APPOINTMENT (OUTPATIENT)
Dept: LAB | Facility: CLINIC | Age: 69
End: 2019-02-20
Payer: MEDICARE

## 2019-02-20 ENCOUNTER — TELEPHONE (OUTPATIENT)
Dept: FAMILY MEDICINE | Facility: CLINIC | Age: 69
End: 2019-02-20

## 2019-02-20 DIAGNOSIS — I82.411 ACUTE DEEP VEIN THROMBOSIS (DVT) OF FEMORAL VEIN OF RIGHT LOWER EXTREMITY (H): ICD-10-CM

## 2019-02-20 LAB — INR POINT OF CARE: 2.6 (ref 0.86–1.14)

## 2019-02-20 PROCEDURE — 99207 ZZC NO CHARGE NURSE ONLY: CPT | Performed by: FAMILY MEDICINE

## 2019-02-20 PROCEDURE — 85610 PROTHROMBIN TIME: CPT | Mod: QW | Performed by: FAMILY MEDICINE

## 2019-02-20 PROCEDURE — 36416 COLLJ CAPILLARY BLOOD SPEC: CPT | Performed by: FAMILY MEDICINE

## 2019-02-20 NOTE — TELEPHONE ENCOUNTER
Tried to call pt again, no answer  Called daughter Jeannette, not on [facility]  States she doesn't know what pt has been taking for dose  Pt wrote 5mg daily on sheet  Had appt at [facility] today at 3pm and doesn't have his own car - won't be home until after 5pm  Told daughter will check in with pt tomorrow  In the meantime continue 5mg daily  Looks like he had INR 2/11/2019 too at cardiology office - 4.5 - held 1 day?  Vannessa DEAN RN

## 2019-02-20 NOTE — TELEPHONE ENCOUNTER
INR 2.6  Attempt to reach patient.  No answer, no voice mail.    Phone number patient wrote on questionnaire is daughter's cell  If can't reach patient by end of day can call daughter.     Jacquelyn Ch RN

## 2019-02-21 NOTE — TELEPHONE ENCOUNTER
ANTICOAGULATION FOLLOW-UP CLINIC VISIT    Patient Name:  Chepe Webster  Date:  2/21/2019  Contact Type:  Telephone. Dose instructions given to patient    SUBJECTIVE:  Bleeding Signs/Symptoms: None  Thromboembolic Signs/Symptoms: None    Medication Changes:  No  Dietary Changes:  No  Bacterial/Viral Infection:  No    Missed Coumadin Doses:  None  Other Concerns:  No      ASSESSMENT/PLAN:  See: ANTICOAGULATION QIC flow sheet.    INR 2.6  Plan: Continue 2.5 mg W and 5 mg rest of week = 32.5 mg.wk.  Recheck INR in 3 weeks.  Jacquelyn Ch RN      Dosage adjustment made based on physician directed care plan.    KAILYN BAY

## 2019-03-11 ENCOUNTER — TELEPHONE (OUTPATIENT)
Dept: FAMILY MEDICINE | Facility: CLINIC | Age: 69
End: 2019-03-11

## 2019-03-11 NOTE — TELEPHONE ENCOUNTER
Called Dr. Hall office.  They will send message to Dr. Hall nurse and have her call back.  Jacquelyn Ch RN

## 2019-03-11 NOTE — TELEPHONE ENCOUNTER
I also can't see any details about stopping coumadin or going to only plavix after procedure.  I do see that he is scheduled for an IR procedure on 3/22/19 under Dr. Hall's name.  Contact info from 3/22/19 appt notes below.  Could you call him/his office to confirm medication recs?  Thanks!  Amandeep Pang MD    Surgery    [address] [place] [address]         Phone: [phone]    Fax: [phone]

## 2019-03-11 NOTE — TELEPHONE ENCOUNTER
Reason for Call:  INR Question     Detailed comments: Pt is wondering if needs to come in for his INR this week. Pt states he is going off the medication this weekend.    Phone Number Patient can be reached at: Home number on file [phone] (home)    Best Time: any    Can we leave a detailed message on this number? YES    Call taken on 3/11/2019 at 9:37 AM by [place]

## 2019-03-11 NOTE — TELEPHONE ENCOUNTER
CW,   Patient called stating he will no longer be on Warfarin    Was to get next INR checked 3/13/2019  States he will be stopping Warfarin though on 3/17/2019  Told pt not necessary to get INR if will be stopping 4 days later (no need to dose him)    Has angiogram scheduled for 2/25/2019 and surgeon wants him off Warfarin for 1 week  States surgeon told him he should no longer be on Warfarin after 3/17/2019 - will just be on Plavix after that    States he was told this at his 2/6/2019 OV  I don't see documentation of this  Can you see anything I can't about Warfarin?  Vannessa DEAN, IVORY

## 2019-03-12 NOTE — TELEPHONE ENCOUNTER
Discussed below with pt as he was initially calling about next INR date  Pt states he was told he won't be on Warfarin anymore - addiment about that  States he will discuss with Dr Hall next Friday 3/22/2019 at procedure  Told pt if/when restarted on Warfarin to recheck INR after being on it for 1 week at current dose  Pt agrees with plan IF restarted on Warfarin he said  Vannessa DEAN RN

## 2019-03-12 NOTE — TELEPHONE ENCOUNTER
Noted- thanks for getting the update.  Closed encounter thinking it was complete, but will send back to triage in case anything is needed for f/u.  Thanks!  CW

## 2019-04-16 NOTE — TELEPHONE ENCOUNTER
Received call from Adenike at [place].  Chepe is scheduled on 01/02/19 to see Dr. Carmen. Nuris Mcmillan,      Yes

## 2019-04-18 NOTE — TELEPHONE ENCOUNTER
Following up on pt's Warfarin   Pt still on INR pt list  Looks like it was discontinued by Dr Hall 3/22/2019  Called Dr Hall's office - Warfarin was D/C'd per nurse  Vannessa DEAN RN

## 2019-06-03 PROCEDURE — 82274 ASSAY TEST FOR BLOOD FECAL: CPT | Performed by: FAMILY MEDICINE

## 2019-06-05 DIAGNOSIS — Z12.11 COLON CANCER SCREENING: ICD-10-CM

## 2019-06-05 NOTE — LETTER
June 6, 2019      Chepe ACUÑA Darin  [address]  [address]  [facility] [address]        Dear ,    We are writing to inform you of your test results.    Here are your lab results from your recent visit...   -Your FIT test (the screening test for colon cancer by testing for blood in your stool), was negative.  We should continue to check this yearly (unless you have a colonoscopy instead).     Resulted Orders   Fecal colorectal cancer screen (FIT)   Result Value Ref Range    Occult Blood Scn FIT Negative NEG^Negative       If you have any questions or concerns, please call the clinic at the number listed above.       Sincerely,        Petra Reynolds MD/ms

## 2019-06-06 LAB — HEMOCCULT STL QL IA: NEGATIVE

## 2019-06-06 NOTE — RESULT ENCOUNTER NOTE
Please send letter below with copy of results.  Thanks! CW    Here are your lab results from your recent visit...  -Your FIT test (the screening test for colon cancer by testing for blood in your stool), was negative.  We should continue to check this yearly (unless you have a colonoscopy instead).    Please let me know if you have any questions.  Best,   Gabriel Reynolds MD

## 2019-07-10 ENCOUNTER — TRANSFERRED RECORDS (OUTPATIENT)
Dept: HEALTH INFORMATION MANAGEMENT | Facility: CLINIC | Age: 69
End: 2019-07-10

## 2020-01-07 ENCOUNTER — TRANSFERRED RECORDS (OUTPATIENT)
Dept: HEALTH INFORMATION MANAGEMENT | Facility: CLINIC | Age: 70
End: 2020-01-07

## 2020-01-21 ENCOUNTER — OFFICE VISIT (OUTPATIENT)
Dept: FAMILY MEDICINE | Facility: CLINIC | Age: 70
End: 2020-01-21
Payer: MEDICARE

## 2020-01-21 VITALS
TEMPERATURE: 97.7 F | SYSTOLIC BLOOD PRESSURE: 127 MMHG | HEART RATE: 69 BPM | OXYGEN SATURATION: 95 % | HEIGHT: 73 IN | BODY MASS INDEX: 25.46 KG/M2 | RESPIRATION RATE: 14 BRPM | WEIGHT: 192.06 LBS | DIASTOLIC BLOOD PRESSURE: 79 MMHG

## 2020-01-21 DIAGNOSIS — R06.00 DYSPNEA, UNSPECIFIED TYPE: ICD-10-CM

## 2020-01-21 DIAGNOSIS — R79.89 ELEVATED TSH: ICD-10-CM

## 2020-01-21 DIAGNOSIS — D64.9 ANEMIA, UNSPECIFIED TYPE: Primary | ICD-10-CM

## 2020-01-21 DIAGNOSIS — E03.8 OTHER SPECIFIED HYPOTHYROIDISM: ICD-10-CM

## 2020-01-21 LAB
ERYTHROCYTE [DISTWIDTH] IN BLOOD BY AUTOMATED COUNT: 20.1 % (ref 10–15)
HCT VFR BLD AUTO: 37.3 % (ref 40–53)
HGB BLD-MCNC: 11.7 G/DL (ref 13.3–17.7)
MCH RBC QN AUTO: 31.5 PG (ref 26.5–33)
MCHC RBC AUTO-ENTMCNC: 31.4 G/DL (ref 31.5–36.5)
MCV RBC AUTO: 100 FL (ref 78–100)
PLATELET # BLD AUTO: 293 10E9/L (ref 150–450)
RBC # BLD AUTO: 3.72 10E12/L (ref 4.4–5.9)
WBC # BLD AUTO: 4.3 10E9/L (ref 4–11)

## 2020-01-21 PROCEDURE — 82746 ASSAY OF FOLIC ACID SERUM: CPT | Performed by: FAMILY MEDICINE

## 2020-01-21 PROCEDURE — 82728 ASSAY OF FERRITIN: CPT | Performed by: FAMILY MEDICINE

## 2020-01-21 PROCEDURE — 99215 OFFICE O/P EST HI 40 MIN: CPT | Performed by: FAMILY MEDICINE

## 2020-01-21 PROCEDURE — 84439 ASSAY OF FREE THYROXINE: CPT | Performed by: FAMILY MEDICINE

## 2020-01-21 PROCEDURE — 36415 COLL VENOUS BLD VENIPUNCTURE: CPT | Performed by: FAMILY MEDICINE

## 2020-01-21 PROCEDURE — 86376 MICROSOMAL ANTIBODY EACH: CPT | Performed by: FAMILY MEDICINE

## 2020-01-21 PROCEDURE — 84443 ASSAY THYROID STIM HORMONE: CPT | Performed by: FAMILY MEDICINE

## 2020-01-21 PROCEDURE — 85379 FIBRIN DEGRADATION QUANT: CPT | Performed by: FAMILY MEDICINE

## 2020-01-21 PROCEDURE — 82607 VITAMIN B-12: CPT | Performed by: FAMILY MEDICINE

## 2020-01-21 PROCEDURE — 85027 COMPLETE CBC AUTOMATED: CPT | Performed by: FAMILY MEDICINE

## 2020-01-21 ASSESSMENT — MIFFLIN-ST. JEOR: SCORE: 1680.07

## 2020-01-21 ASSESSMENT — PAIN SCALES - GENERAL: PAINLEVEL: NO PAIN (0)

## 2020-01-21 NOTE — PROGRESS NOTES
Subjective   Chepe Webster is a 69 year old male who presents to clinic today for the following health issues:    HPI   Patient is here today to talk about  His thyroid problem and anemia issue    Pt was getting SOB and weak, thought it was related to his heart, so he went to his cardiologist at [facility].   He has h/o of three MIs, and four stents placed.  He also had a fem/art bypass done in 12/18.  Now off coumadin and plavix, but still on asa EC 81mg/d.    At his 1/07/20 cardiology appt he c/o dyspnea with walking and weakness.  Did EKG which was okay.  Did labs, and found abnls...  Labs on 1/10/20...  Lipids- trigs 165, LDL 87, HDL 42.  BMP normal, GFR 81.    CBC- hgb 9.6, MCV 99, plts 324.  TSH 50  T4 0.7    With these abnormal hemoglobin and thyroid results, they cancelled the planned cardiac stress test and recommended he f/u with PCP.      Previous thyroid testing- TSH 2.4 in 4/03  He has had low energy/dyspnea with walking- first noticed it few weeks ago.    Wt is up some- weighs more than he has in the past.  Also was feeling out of breath- had to stop walking after a block.  He's always had constipation- BM about every four days.   No change in stools, no bloody or tarry stools.  No known fam h/o anemia or GI issues.    Anemia-  Able to review [facility] testing.  Hgb was normal until ~10/16, mildly low (12s), and 10/21-22/17 11.6 to 10.8, likely hospitalization or surgery related anemia).  2018 10.8, 12.4, 9-10s, 7-8s in 12/18 (near surgery 12/20-24), with low of 6.9 after surgery.  7.3 likely at discharge, and normal 14.0 in 3/19, downto 9.6 in 1/7/20.  He's never had a colonoscopy.  FIT negative in 6/19.  Heartburn sx's? None recently, did yrs ago, and during hospital stay in 12/18, rare heartburn since going home- only if he overeats.  Lets it past, if really bad, takes baking soda and water.      Breathing sx's for last 2-3 wks-   Was walking 5-6 blocks with grocery bags, now stopping to rest  mid-way.  Still doing 100 sit-ups and push-ups nightly.  Sometimes has to stop half-way through, since early .  Sometimes he doesn't have to stop, feels normal.      He started taking iron daily after his 20 cardiology appt, and thinks his breathing/energy sx's are already significantly improved, but not resolved.        Patient Active Problem List   Diagnosis     Coronary artery disease involving native coronary artery of native heart without angina pectoris     Hyperlipidemia LDL goal <100     Smoking     PAD (peripheral artery disease) (H)     Urticaria     History of nephrolithiasis     Primary insomnia     S/P femoral-popliteal bypass surgery     Acute deep vein thrombosis (DVT) of femoral vein of right lower extremity (H)     Anticoagulation management encounter     Urinary retention     Scrotal edema     Essential hypertension     Past Surgical History:   Procedure Laterality Date     AS REVASCULARIZE FEM/POP ARTERY, ANGIOPLASTY/STENT      multiple in '15 and      CYSTOSCOPY      multiple      HC URETERAL STENT REPOSITIONING Left 2016       Social History     Tobacco Use     Smoking status: Former Smoker     Types: Cigars     Last attempt to quit: 2018     Years since quittin.1     Smokeless tobacco: Never Used     Tobacco comment: - was down to cigars twice a week   Substance Use Topics     Alcohol use: No     Family History   Problem Relation Age of Onset     Ovarian Cancer Mother          at 63     Diabetes Father      Coronary Artery Disease Father          at 65 of heart issues     Cancer Maternal Grandfather         oral cancer, chewed tobacco         Current Outpatient Medications   Medication Sig Dispense Refill     aspirin EC 81 MG EC tablet Take 81 mg by mouth daily        metoprolol tartrate (LOPRESSOR) 25 MG tablet Take 12.5 mg by mouth 2 times daily       simvastatin (ZOCOR) 40 MG tablet Take 40 mg by mouth At Bedtime        levothyroxine  "(SYNTHROID/LEVOTHROID) 50 MCG tablet Take 1 tablet (50 mcg) by mouth daily 60 tablet 0     Allergies   Allergen Reactions     No Known Allergies      Recent Labs   Lab Test 01/21/20  1521 01/16/19  1525 12/31/18 01/07/13   LDL  --   --   --   --  90   HDL  --   --   --   --  52   TRIG  --   --   --   --  96   CR  --  0.91 0.93   < >  --    GFRESTIMATED  --  85 62   < >  --    GFRESTBLACK  --  >90 72   < >  --    POTASSIUM  --  4.6 4.1   < >  --    TSH 48.22*  --   --   --   --     < > = values in this interval not displayed.      BP Readings from Last 3 Encounters:   01/21/20 127/79   01/16/19 136/82   01/04/19 102/64    Wt Readings from Last 3 Encounters:   01/21/20 87.1 kg (192 lb 1 oz)   01/16/19 80.8 kg (178 lb 1 oz)   01/04/19 86.5 kg (190 lb 12.8 oz)                      Reviewed and updated as needed this visit by Provider  Tobacco  Allergies  Meds  Problems  Med Hx  Surg Hx  Fam Hx  Soc Hx          Review of Systems   ROS COMP: Constitutional, HEENT, cardiovascular, pulmonary, gi and gu systems are negative, except as otherwise noted.      Objective    /79 (BP Location: Right arm, Patient Position: Sitting, Cuff Size: Adult Large)   Pulse 69   Temp 97.7  F (36.5  C) (Oral)   Resp 14   Ht 1.854 m (6' 1\")   Wt 87.1 kg (192 lb 1 oz)   SpO2 95%   BMI 25.34 kg/m    Body mass index is 25.34 kg/m .  Physical Exam   GENERAL APPEARANCE: healthy, alert and no distress     EYES: PERRL, sclera clear     HENT: nose and mouth without ulcers or lesions     NECK: no adenopathy, no asymmetry, masses, or scars and thyroid normal to palpation     RESP: lungs clear to auscultation - no rales, rhonchi or wheezes     CV: regular rates and rhythm, normal S1 S2, no S3 or S4 and no murmur, click or rub, no JVD, no carotid bruits     Abdomen: soft, nontender, no HSM or masses and bowel sounds normal     Ext: warm, dry, no edema      Psych: full range affect, normal speech and grooming, judgement and insight " intact     Diagnostic Test Results:  Labs reviewed in Epic  Results for orders placed or performed in visit on 01/21/20   TSH with free T4 reflex     Status: Abnormal   Result Value Ref Range    TSH 48.22 (H) 0.40 - 4.00 mU/L   D dimer, quantitative     Status: None   Result Value Ref Range    D Dimer 0.5 0.0 - 0.50 ug/ml FEU   Thyroid peroxidase antibody     Status: Abnormal   Result Value Ref Range    Thyroid Peroxidase Antibody 1,992 (H) <35 IU/mL   CBC with platelets     Status: Abnormal   Result Value Ref Range    WBC 4.3 4.0 - 11.0 10e9/L    RBC Count 3.72 (L) 4.4 - 5.9 10e12/L    Hemoglobin 11.7 (L) 13.3 - 17.7 g/dL    Hematocrit 37.3 (L) 40.0 - 53.0 %     78 - 100 fl    MCH 31.5 26.5 - 33.0 pg    MCHC 31.4 (L) 31.5 - 36.5 g/dL    RDW 20.1 (H) 10.0 - 15.0 %    Platelet Count 293 150 - 450 10e9/L   Vitamin B12     Status: None   Result Value Ref Range    Vitamin B12 251 193 - 986 pg/mL   Folate     Status: None   Result Value Ref Range    Folate 9.2 >5.4 ng/mL   Ferritin     Status: None   Result Value Ref Range    Ferritin 163 26 - 388 ng/mL   T4 free     Status: Abnormal   Result Value Ref Range    T4 Free 0.68 (L) 0.76 - 1.46 ng/dL           Assessment & Plan       ICD-10-CM    1. Anemia, unspecified type D64.9 CBC with platelets     Vitamin B12     Folate     Ferritin     T4 free     T4 free   2. Dyspnea, unspecified type R06.00 D dimer, quantitative     CBC with platelets   3. Elevated TSH R79.89 TSH with free T4 reflex     Thyroid peroxidase antibody   4. Other specified hypothyroidism  E03.8 TSH with free T4 reflex     68yo vasculopath here for f/u dyspnea and weakness- sent here for f/u after findings of high TSH/low T4, and lower hgb at recent cardiology appt at [facility].  Stress test cancelled after these findings.    Anemia-   Pt has had on/off anemia for last few yrs, unsure dx.  Did recover from PAD surgery in 12/18, with hgb recovering from 7's at discharge to 14s in 3/19.  Hgb checked at  "cardiology visit a couple weeks ago (1/7/20) along with other labs due to fatigue and SOB, and it was back down to 9.6.     He did have a positive hemacult noted at one of his hospitalizations in the last 1-2 yrs, colonoscopy recommended but he declined.  He did get a FIT test in 6/19 and this was negative.  No BRBPR or abnormal stools.  No GERD sx's.  MCV on high end of normal, so ddx includes thyroid, B12/folate deficiency.  TSH was 50 at 1/7/20 visit, so could be part of it.  Will check/recheck labs as above.  Pt declines scopes at this point, but will revisit if not improving with other potential routes of addressing it.  Pt has already started back on iron for the last couple weeks, and has felt a bit better with it.  Indeed, hgb is better at 11.7 today.  MCV still higher at 100.      Thyroid- TSH was last checked many yrs ago and was normal.  Now TSH 50 at 1/7/20 cardiology visit, with low T4.  Will recheck these today, and if still abnl in these ranges, will start levothyroxine supplementation.  Risks and benefits of medication(s) including potential side effects reviewed with patient.  Questions answered.  Reviewed this entails frequent testing/dose adjustments at times, especially at beginning.    Dyspnea- suspect sx's due to hypothyroidism or anemia (as he has felt better with the initiation of iron supplementation), but will check d-dimer to r/o DVT/PE.  CT scan if positive.     BMI:   Estimated body mass index is 25.34 kg/m  as calculated from the following:    Height as of this encounter: 1.854 m (6' 1\").    Weight as of this encounter: 87.1 kg (192 lb 1 oz).         Return in about 6 weeks (around 3/3/2020) for Thyroid labs, f/u 12 wk APPT and LAB, for anemia w/u, thyroid, chronic cares .     Petra Reynolds MD  [facility]      Spent greater than 50% of 40 minutes in face to face time counseling patient and coordination of care regarding multiple issues as outlined above.      "

## 2020-01-22 LAB
D DIMER PPP FEU-MCNC: 0.5 UG/ML FEU (ref 0–0.5)
FERRITIN SERPL-MCNC: 163 NG/ML (ref 26–388)
FOLATE SERPL-MCNC: 9.2 NG/ML
T4 FREE SERPL-MCNC: 0.68 NG/DL (ref 0.76–1.46)
TSH SERPL DL<=0.005 MIU/L-ACNC: 48.22 MU/L (ref 0.4–4)
VIT B12 SERPL-MCNC: 251 PG/ML (ref 193–986)

## 2020-01-23 LAB — THYROPEROXIDASE AB SERPL-ACNC: 1992 IU/ML

## 2020-02-05 ENCOUNTER — TELEPHONE (OUTPATIENT)
Dept: FAMILY MEDICINE | Facility: CLINIC | Age: 70
End: 2020-02-05

## 2020-02-05 DIAGNOSIS — I82.411 ACUTE DEEP VEIN THROMBOSIS (DVT) OF FEMORAL VEIN OF RIGHT LOWER EXTREMITY (H): ICD-10-CM

## 2020-02-05 DIAGNOSIS — E06.3 HYPOTHYROIDISM DUE TO HASHIMOTO'S THYROIDITIS: Primary | ICD-10-CM

## 2020-02-05 DIAGNOSIS — I73.9 PAD (PERIPHERAL ARTERY DISEASE) (H): ICD-10-CM

## 2020-02-05 DIAGNOSIS — I25.10 CORONARY ARTERY DISEASE INVOLVING NATIVE CORONARY ARTERY OF NATIVE HEART WITHOUT ANGINA PECTORIS: ICD-10-CM

## 2020-02-05 NOTE — TELEPHONE ENCOUNTER
Have tried to call pt a few times, no answers.  Tried again this afternoon- no answers.  Will try again tomorrow morning.  CW

## 2020-02-05 NOTE — TELEPHONE ENCOUNTER
Reason for Call:  Request for results:    Name of test or procedure: blood work    Date of test of procedure: 1/21/2020    Location of the test or procedure: [place]    OK to leave the result message on voice mail or with a family member? No    Phone number Patient can be reached at:  Home number on file [phone] (home)    Additional comments: any    Call taken on 2/5/2020 at 9:02 AM by Israel Walden

## 2020-02-05 NOTE — TELEPHONE ENCOUNTER
CW,   Patient requesting 1/21/2020 results  Several abnormal results  Please advise  Thanks,  Vannessa DEAN RN

## 2020-02-06 PROBLEM — I82.411 ACUTE DEEP VEIN THROMBOSIS (DVT) OF FEMORAL VEIN OF RIGHT LOWER EXTREMITY (H): Status: ACTIVE | Noted: 2018-12-28

## 2020-02-06 RX ORDER — LEVOTHYROXINE SODIUM 50 UG/1
50 TABLET ORAL DAILY
Qty: 60 TABLET | Refills: 0 | Status: SHIPPED | OUTPATIENT
Start: 2020-02-06

## 2020-02-06 NOTE — TELEPHONE ENCOUNTER
Called pt to discuss labs and f/u sx's- able to reach him this morning (after multiple previous calls).  He reports he's feeling much better.  Sx's of SOB with walking and weakness sx's are pretty much gone.  He does still have some fatigue.  No other thyroid-like sx's upon review on phone, but he did think his weight was up a bit at his appt.   The only real change he's done was continue on iron supplementation daily (started that after 1/7/20 appt w/ cardiology w/ low hgb and high TSH found).    Discussed labs...  TSH still very high at 48..  T4 still a bit low at 0.67.  TPO very high >1000.    Other labs- B12 on lower end of normal.    CBC with hgb at 11.7, which is low, though higher than last year (though that was around the time of his fem/pop bypass, hgb's ranged from 8-10s).  .  B12 on low end of normal.  Ferritin in 100s.  D-dimer 0.5 (on higher end of normal).    Plan-   Will have him continue the iron as that seems to have made him feel better.  Will need to look further into the reasons for potential JACQUELINE at his f/u appt, however.    For his hypothyroidism (Hashimoto's due to the high TPO), will start him on levothyroxine 50mcg/d, and have him rtc for lab f/u in 6 wks.  Okay for lab-only appt at that time if he is feeling well/fine.  If any sx's, make appt as well.  Do want to see him for 3 month f/u appt to recheck thyroid and anemia labs, and look into JACQUELINE causes.    Pt agrees with plan.      Risks and benefits of medication(s) including potential side effects reviewed with patient.  Questions answered.

## 2020-07-30 ENCOUNTER — TELEPHONE (OUTPATIENT)
Dept: FAMILY MEDICINE | Facility: CLINIC | Age: 70
End: 2020-07-30

## 2020-07-30 NOTE — TELEPHONE ENCOUNTER
Patient Quality Outreach      Summary:    Patient is due/failing the following:   Annual wellness, date due: now    Type of outreach:    Sent letter.    Questions for provider review:    None                                                                                   **Start Working phrase here:**       Patient has the following on his problem list/HM: None                                                Vannessa DEAN RN

## 2020-07-30 NOTE — LETTER
July 30, 2020      Chepe Webster  [address]  [address]  [place] [address]        Hello!    We care about your health and are committed to providing quality patient care.   It has come to our attention that you are due for an annual wellness visit.   At this time, during the Covid-19 pandemic, we are able to offer these annual wellness visits by phone/video.   If you would prefer to come into the clinic we can schedule you for that as well.     If you have not already scheduled, please call us at [phone] to setup either your virtual or face to face annual wellness exam.    Thanks and have a great day!  Vannessa DEAN RN

## 2021-07-23 NOTE — TELEPHONE ENCOUNTER
Forwarded to triage needs to be resulted    Patient/Caregiver provided printed discharge information.

## 2021-11-22 NOTE — LETTER
1/4/2019        RE: Chepe Webster  [address]  [place] [address]          [facility] DISCHARGE SUMMARY    PATIENT'S NAME: Chepe Webster  YOB: 1950  MEDICAL RECORD NUMBER:  [id number]  Place of Service where encounter took place:  [facility] [[id number]]    PRIMARY CARE PROVIDER AND CLINIC RESPONSIBLE AFTER TRANSFER Petra Reynolds MD    TRANSFERRING PROVIDERS: PETRA Acuña CNP, Jenn Wheat MD  DATE OF SNF ADMISSION:  December / 27 / 2018  DATE OF SNF (anticipated) DISCHARGE: January / 06 / 2019  DISCHARGE DISPOSITION: Non-[facility] Provider   RECENT HOSPITALIZATION/ED:  [facility]ospital  [facility] [place] 12/17/2018 to 12/27/2018.     CODE STATUS/ADVANCE DIRECTIVES DISCUSSION:   CPR/Full code      Allergies   Allergen Reactions     No Known Allergies      Condition on Discharge:  Improving.  Function:  Mostly independent  Cognitive Scores: SLUMS 29/30    Equipment: walker    DISCHARGE DIAGNOSIS:   1. S/P femoral-popliteal bypass surgery    2. PAD (peripheral artery disease) (H)    3. Acute deep vein thrombosis (DVT) of femoral vein of right lower extremity (H)    4. Anticoagulation management encounter    5. Anemia due to blood loss, acute    6. Coronary artery disease involving native coronary artery of native heart without angina pectoris    7. Hyperlipidemia LDL goal <100    8. Essential hypertension    9. History of nephrolithiasis    10. Urinary retention    11. Scrotal edema    12. Primary insomnia    13. Chronic constipation        HPI Nursing Facility Course:  HPI information obtained from: facility chart records, facility staff, patient report and Care Everywhere Epic chart review.    S/P femoral-popliteal bypass surgery  PAD (peripheral artery disease) (H)- h/o multiple stents in right mid to distal SFA.   Patient transferred to TCU from hospital after treatment for RLE claudication. On 12/17 he underwent a right common  femoral endarterectomy, right popliteal artery endarterectomy and femoral to popliteal bypass with in situ saphenous vein graft. Post op issues include acute DVT of right femoral vein, urinary retention, scrotal swelling, and acute blood loss anemia.   Today he has very little pain. He is up and walking with a walker. The swelling has diminished.     Acute deep vein thrombosis (DVT) of femoral vein of right lower extremity (H)  Anticoagulation management encounter  Patient found to have acute DVT of right femoral vein on post op day two. He was started on heparin and warfarin on 12/20. Patient was offered suction thrombectomy by IR but he declined. He does have an appointment with Dr Hall on 1/9/19 at 3:20. He did go to see vascular surgery at [facility] for a second opinion. Dr Carmen recommended having the DVT removed and contacted Dr Hall.   INR goal 2-3 and should continue warfarin for 3-6 mos.   INR today 1.0. We discussed the need to have INR checked on 1/7. I did increase the warfarin to 6mg q day.   ABLA- hgb dropped to 7.0 from 9.4.on POD#3. He was transfused one unit PRBC. hgb was 12.3 in November 2016. No record of iron studies  Coronary artery disease involving native coronary artery of native heart without angina pectoris  Hyperlipidemia LDL goal <100  Hypertension  BP's: 102/64, 112/73, 135/76  s/p PCI to RCA with BMS on 12/30/12.   He continues on metoprolol, simvastatin.     History of nephrolithiasis- bilateral.   Urinary retention  Scrotal edema  Urology was consulted during this hospitalization due to urinary retention. flomax was started. Prior to transfer to TCU he was able to empty his bladder. He did develop scrotal edema during hospital stay. This has diminished since in TCU.     Primary insomnia  Takes trazodone. Melatonin - he states these meds are not helpful and does not want to take them any longer.    chronic constipation- patient reports for past 30 years he has bowel movement every 4 days. He  "states his bowels are working according to his schedule.  Recommendation made for colonoscopy due to fecal occult pos for blood.       PAST MEDICAL HISTORY:  has a past medical history of Other specified urticaria, Pure hypercholesterolemia, and Tobacco use disorder.    DISCHARGE MEDICATIONS:  Current Outpatient Medications   Medication Sig Dispense Refill     aspirin EC 81 MG EC tablet Take 81 mg by mouth daily        calcium carbonate (TUMS) 500 MG chewable tablet Take 1 chew tab by mouth 3 times daily as needed for heartburn       metoprolol tartrate (LOPRESSOR) 25 MG tablet Take 12.5 mg by mouth 2 times daily       simvastatin (ZOCOR) 40 MG tablet Take 40 mg by mouth At Bedtime        warfarin (COUMADIN) 4 MG tablet Take by mouth daily          MEDICATION CHANGES/RATIONALE:   none  Controlled medications sent with patient:   not applicable/none     ROS:    4 point ROS including Respiratory, CV, GI and , other than that noted in the HPI,  is negative    Physical Exam:   Vitals: /64   Pulse 94   Temp 99  F (37.2  C)   Resp 16   Ht 1.854 m (6' 1\")   Wt 86.5 kg (190 lb 12.8 oz)   SpO2 97%   BMI 25.17 kg/m     BMI= Body mass index is 25.17 kg/m .    GENERAL APPEARANCE:  Alert, in no distress  ENT:  Mouth and posterior oropharynx normal, moist mucous membranes, hearing acuity adequate   EYES:  EOM, conjunctivae, lids, pupils and irises normal  RESP:  respiratory effort and palpation of chest normal, no respiratory distress, Lung sounds clear  CV:  Palpation and auscultation of heart done , rate and rhythm reg, no murmur, no rub or gallop, Edema 1+ on right. Trace on left  ABDOMEN:  normal bowel sounds, soft, nontender, no hepatosplenomegaly or other masses  M/S:   Gait and station steady, Digits and nails normal   SKIN:  Inspection/Palpation of skin and subcutaneous tissue incisions on right thigh- staples intact. There is surrounding erythema.   NEURO: 2-12 in normal limits and at patient's " baseline  PSYCH:  insight and judgement, memory intact , affect and mood normal    DISCHARGE PLAN:  Declined home care services  Patient instructed to follow-up with:  PCP in 7 days  He does need an INR checked on 1/7    Current [place] scheduled appointments:  No future appointments.    MTM referral needed and placed by this provider: No    Pending labs: none  SNF labs   CBC RESULTS:   Recent Labs   Lab Test 12/31/18 12/04/18  1134   WBC 6.7  --    RBC 2.96*  --    HGB 8.0* 10.5*   HCT 25.9*  --    MCV 88  --    MCH 27.0  --    MCHC 30.9*  --    RDW 19.4*  --    *  --        Last Basic Metabolic Panel:  Recent Labs   Lab Test 12/31/18 12/04/18  1134    137   POTASSIUM 4.1 4.3   CHLORIDE 103 104   AMA 8.3* 9.4   CO2 23 23   BUN 16 17   CR 0.93 0.99   * 96     TSH   Date Value Ref Range Status   04/15/2003 2.38 0.4 - 5.0 mU/L Final       Discharge Treatments:none    TOTAL DISCHARGE TIME:   Greater than 30 minutes  Electronically signed by:  PETRA Acuña CNP      Sincerely,        PETRA Acuña CNP     Oriented - self; Oriented - place; Oriented - time

## 2023-02-10 NOTE — TELEPHONE ENCOUNTER
ACUTE OCCUPATIONAL THERAPY GOALS:   (Developed with and agreed upon by patient and/or caregiver.)  1. Pt will complete LB ADL Mod I.  2. Pt will complete toileting Mod I.  3. Pt will complete UB ADL Mod I.  4. Pt will tolerate 25 minutes of OT treatment requiring 1-2 breaks as needed. 5. Pt will complete grooming tasks while standing at sink Mod I..  6. Pt will complete functional mobility Mod I via RW.  7. Pt will tolerate BUE exercises to increase strength for safe, functional transfers, ADL participation. 8. Pt will verbalize and/or demonstrate understanding of sternal precautions during functional activity. 9. Pt will verbalize and/or demonstrate understanding of mobilizing with WBAT for LLE during functional activity 100% of the time. Timeframe: 7 visits    OCCUPATIONAL THERAPY: Daily Note AM   OT Visit Days: 2   Time In/Out  OT Charge Capture  Rehab Caseload Tracker  OT Orders    Daija Mancilla is a 66 y.o. female   PRIMARY DIAGNOSIS: Closed fracture of left hip ([facility])  Right hip pain [M25.551]  Closed fracture of left hip, initial encounter ([facility]) [S72.002A]  Acute pain of left hip [M25.552]  Procedure(s) (LRB):  HIP OPEN REDUCTION INTERNAL FIXATION (Left)  2 Days Post-Op  Inpatient: Payor: [address] [place] [address],[address] / Plan: MEDICARE PART A AND B / Product Type: *No Product type* /     ASSESSMENT:     REHAB RECOMMENDATIONS: CURRENT LEVEL OF FUNCTION:  (Most Recently Demonstrated)   Recommendation to date pending progress:  Setting:  Inpatient Rehab Facility    Equipment:    Rolling Walker Bathing:  Not Tested  Dressing:  Supervision/Setup  Feeding/Grooming:  Supervision/Setup  Toileting:  Contact Guard Assist- Min A  Functional Mobility:  Contact Guard Assist- Min A RW     ASSESSMENT:  Ms. Jermaine Willams was received supine in bed with pillow under L knee and extreme hip external rotation with abduction. OT discouraged pt from laying in the bed in this position.  Pt required assistance for functional mobility and CW  Spoke with Dr. Hall's nurse.  Patient is holding Warfarin 3/18 - 3/22 for angiogram.    No plans to stop Warfarin permanently.  Thanks,  Jacquelyn Ch RN     ADLs today due to generalized weakness, L hip pain/deformity, decreased standing balance. Pt does not need purewick anymore. Pt can call for assistance to use BSC. Pt is progressing with functional mobility and ADLs very well! Continue POC. SUBJECTIVE:     Ms. Florence Orlando states, \"I haven't pooped since I've been here so it's gonna stink! \"     Social/Functional Lives With: Spouse  Type of Home: House  Home Layout: One level  Home Access: Stairs to enter without rails  Entrance Stairs - Number of Steps: 2  ADL Assistance: Independent  Ambulation Assistance: Independent  Transfer Assistance: Independent    OBJECTIVE:     Kasia Denson / Grisel Koehler / Joaquín Rao: IV and Purewick    RESTRICTIONS/PRECAUTIONS:  Restrictions/Precautions  Restrictions/Precautions: Weight Bearing, Fall Risk  Lower Extremity Weight Bearing Restrictions  Left Lower Extremity Weight Bearing: Weight Bearing As Tolerated        PAIN: VITALS / O2:   Pre Treatment:   Pain Assessment: 0-10  Pain Level: 0  Non-quantified pain during movement      Post Treatment: 0 Vitals          Oxygen        MOBILITY: I Mod I S SBA CGA Min Mod Max Total  NT x2 Comments:   Bed Mobility    Rolling [] [] [] [] [] [] [] [] [] [] []    Supine to Sit [] [] [] [] [] [] [x] [] [] [] []    Scooting [] [] [] [] [] [] [] [] [] [] []    Sit to Supine [] [] [] [] [] [] [] [] [] [] []    Transfers    Sit to Stand [] [] [] [] [x] [x] [] [] [] [] []    Bed to Chair [] [] [] [] [x] [x] [] [] [] [] []    Stand to Sit [] [] [] [] [x] [x] [] [] [] [] []    Tub/Shower [] [] [] [] [] [] [] [] [] [] []     Toilet [] [] [] [] [] [x] [] [] [] [] []  BSC over toilet    [] [] [] [] [] [] [] [] [] [] []    I=Independent, Mod I=Modified Independent, S=Supervision/Setup, SBA=Standby Assistance, CGA=Contact Guard Assistance, Min=Minimal Assistance, Mod=Moderate Assistance, Max=Maximal Assistance, Total=Total Assistance, NT=Not Tested    ACTIVITIES OF DAILY LIVING: I Mod I S SBA CGA Min Mod Max Total NT Comments   BASIC ADLs:              Upper Body Bathing [] [] [] [] [] [] [] [] [] []    Lower Body Bathing [] [] [] [] [] [] [] [] [] []    Toileting [] [] [] [] [x] [x] [] [] [] [] Standing for pericare; A for dynamic standing balance   Upper Body Dressing [] [] [x] [] [] [] [] [] [] [] Seated gown   Lower Body Dressing [] [] [] [] [] [] [] [] [] []    Feeding [] [] [] [] [] [] [] [] [] []    Grooming [] [] [x] [] [] [] [] [] [] [] Seated washing hands   Personal Device Care [] [] [] [] [] [] [] [] [] []    Functional Mobility [] [] [] [] [x] [x] [] [] [] [] Bed > BSC over toilet > chair by door in room via RW   I=Independent, Mod I=Modified Independent, S=Supervision/Setup, SBA=Standby Assistance, CGA=Contact Guard Assistance, Min=Minimal Assistance, Mod=Moderate Assistance, Max=Maximal Assistance, Total=Total Assistance, NT=Not Tested    BALANCE: Good Fair+ Fair Fair- Poor NT Comments   Sitting Static [x] [] [] [] [] []    Sitting Dynamic [] [x] [x] [] [] []              Standing Static [] [x] [x] [] [] []    Standing Dynamic [] [] [x] [] [] []        PLAN:     FREQUENCY/DURATION   OT Plan of Care: 5 times/week for duration of hospital stay or until stated goals are met, whichever comes first.    TREATMENT:     TREATMENT:   Co-Treatment PT/OT necessary due to patient's decreased overall endurance/tolerance levels, as well as need for high level skilled assistance to complete functional transfers/mobility and functional tasks  Self Care (32 minutes): Patient participated in toileting, upper body dressing, and grooming ADLs in supported sitting and standing with minimal visual, verbal, manual, and tactile cueing to increase independence, decrease assistance required, increase activity tolerance, increase safety awareness, and maintain precautions.  Patient also participated in functional mobility, functional transfer, and adaptive equipment training to increase independence, decrease assistance required, increase activity tolerance, increase safety awareness, and maintain precautions. TREATMENT GRID:  N/A    AFTER TREATMENT PRECAUTIONS: Alarm Activated, Bed/Chair Locked, Call light within reach, Chair, Needs within reach, RN notified, and Side rails x3    INTERDISCIPLINARY COLLABORATION:  RN/ PCT, PT/ PTA, and OT/ CADENA    EDUCATION:  Education Given To: Patient  Education Provided: Role of Therapy;Plan of Care;Precautions; ADL Adaptive Strategies;Transfer Training;Equipment; Fall Prevention Strategies  Education Outcome: Verbalized understanding    TOTAL TREATMENT DURATION AND TIME:  Time In: 1115  Time Out: [address]  Minutes: [address] OT

## 2023-06-06 ENCOUNTER — OFFICE VISIT (OUTPATIENT)
Dept: FAMILY MEDICINE | Facility: CLINIC | Age: 73
End: 2023-06-06
Payer: MEDICARE

## 2023-06-06 VITALS
HEART RATE: 71 BPM | RESPIRATION RATE: 18 BRPM | HEIGHT: 73 IN | DIASTOLIC BLOOD PRESSURE: 79 MMHG | BODY MASS INDEX: 23.43 KG/M2 | TEMPERATURE: 97.2 F | SYSTOLIC BLOOD PRESSURE: 126 MMHG | WEIGHT: 176.8 LBS | OXYGEN SATURATION: 95 %

## 2023-06-06 DIAGNOSIS — Z23 NEED FOR SHINGLES VACCINE: ICD-10-CM

## 2023-06-06 DIAGNOSIS — R12 HEARTBURN: ICD-10-CM

## 2023-06-06 DIAGNOSIS — E06.3 HYPOTHYROIDISM DUE TO HASHIMOTO'S THYROIDITIS: ICD-10-CM

## 2023-06-06 DIAGNOSIS — I25.10 CORONARY ARTERY DISEASE INVOLVING NATIVE CORONARY ARTERY OF NATIVE HEART WITHOUT ANGINA PECTORIS: ICD-10-CM

## 2023-06-06 DIAGNOSIS — R19.8 GUM SYMPTOMS: ICD-10-CM

## 2023-06-06 DIAGNOSIS — Z00.00 ENCOUNTER FOR MEDICARE ANNUAL WELLNESS EXAM: Primary | ICD-10-CM

## 2023-06-06 DIAGNOSIS — Z11.59 NEED FOR HEPATITIS C SCREENING TEST: ICD-10-CM

## 2023-06-06 DIAGNOSIS — I73.9 PAD (PERIPHERAL ARTERY DISEASE) (H): ICD-10-CM

## 2023-06-06 DIAGNOSIS — D64.9 ANEMIA, UNSPECIFIED TYPE: ICD-10-CM

## 2023-06-06 DIAGNOSIS — G47.09 OTHER INSOMNIA: ICD-10-CM

## 2023-06-06 DIAGNOSIS — Z12.11 SCREEN FOR COLON CANCER: ICD-10-CM

## 2023-06-06 LAB
ALBUMIN SERPL BCG-MCNC: 4.3 G/DL (ref 3.5–5.2)
ALP SERPL-CCNC: 57 U/L (ref 40–129)
ALT SERPL W P-5'-P-CCNC: 21 U/L (ref 10–50)
ANION GAP SERPL CALCULATED.3IONS-SCNC: 12 MMOL/L (ref 7–15)
AST SERPL W P-5'-P-CCNC: 25 U/L (ref 10–50)
BILIRUB SERPL-MCNC: 0.4 MG/DL
BUN SERPL-MCNC: 16.6 MG/DL (ref 8–23)
CALCIUM SERPL-MCNC: 9.6 MG/DL (ref 8.8–10.2)
CHLORIDE SERPL-SCNC: 102 MMOL/L (ref 98–107)
CHOLEST SERPL-MCNC: 175 MG/DL
CREAT SERPL-MCNC: 0.99 MG/DL (ref 0.67–1.17)
DEPRECATED HCO3 PLAS-SCNC: 22 MMOL/L (ref 22–29)
ERYTHROCYTE [DISTWIDTH] IN BLOOD BY AUTOMATED COUNT: 12.7 % (ref 10–15)
FERRITIN SERPL-MCNC: 47 NG/ML (ref 31–409)
FOLATE SERPL-MCNC: 9.9 NG/ML (ref 4.6–34.8)
GFR SERPL CREATININE-BSD FRML MDRD: 79 ML/MIN/1.73M2
GLUCOSE SERPL-MCNC: 110 MG/DL (ref 70–99)
HCT VFR BLD AUTO: 45.4 % (ref 40–53)
HDLC SERPL-MCNC: 46 MG/DL
HGB BLD-MCNC: 15.2 G/DL (ref 13.3–17.7)
MCH RBC QN AUTO: 30.8 PG (ref 26.5–33)
MCHC RBC AUTO-ENTMCNC: 33.5 G/DL (ref 31.5–36.5)
MCV RBC AUTO: 92 FL (ref 78–100)
PLATELET # BLD AUTO: 224 10E3/UL (ref 150–450)
POTASSIUM SERPL-SCNC: 4.5 MMOL/L (ref 3.4–5.3)
PROT SERPL-MCNC: 7.6 G/DL (ref 6.4–8.3)
RBC # BLD AUTO: 4.93 10E6/UL (ref 4.4–5.9)
SODIUM SERPL-SCNC: 136 MMOL/L (ref 136–145)
VIT B12 SERPL-MCNC: 263 PG/ML (ref 232–1245)
WBC # BLD AUTO: 5.9 10E3/UL (ref 4–11)

## 2023-06-06 PROCEDURE — 82728 ASSAY OF FERRITIN: CPT | Performed by: FAMILY MEDICINE

## 2023-06-06 PROCEDURE — 80053 COMPREHEN METABOLIC PANEL: CPT | Performed by: FAMILY MEDICINE

## 2023-06-06 PROCEDURE — 82465 ASSAY BLD/SERUM CHOLESTEROL: CPT | Performed by: FAMILY MEDICINE

## 2023-06-06 PROCEDURE — 82746 ASSAY OF FOLIC ACID SERUM: CPT | Performed by: FAMILY MEDICINE

## 2023-06-06 PROCEDURE — 36415 COLL VENOUS BLD VENIPUNCTURE: CPT | Performed by: FAMILY MEDICINE

## 2023-06-06 PROCEDURE — 82607 VITAMIN B-12: CPT | Performed by: FAMILY MEDICINE

## 2023-06-06 PROCEDURE — 0124A COVID-19 BIVALENT 12+ (PFIZER): CPT | Performed by: FAMILY MEDICINE

## 2023-06-06 PROCEDURE — 91312 COVID-19 BIVALENT 12+ (PFIZER): CPT | Performed by: FAMILY MEDICINE

## 2023-06-06 PROCEDURE — 99214 OFFICE O/P EST MOD 30 MIN: CPT | Mod: 25 | Performed by: FAMILY MEDICINE

## 2023-06-06 PROCEDURE — 85027 COMPLETE CBC AUTOMATED: CPT | Performed by: FAMILY MEDICINE

## 2023-06-06 PROCEDURE — 83718 ASSAY OF LIPOPROTEIN: CPT | Performed by: FAMILY MEDICINE

## 2023-06-06 PROCEDURE — G0439 PPPS, SUBSEQ VISIT: HCPCS | Performed by: FAMILY MEDICINE

## 2023-06-06 RX ORDER — LEVOTHYROXINE SODIUM 50 UG/1
50 TABLET ORAL DAILY
Qty: 60 TABLET | Refills: 0 | Status: CANCELLED | OUTPATIENT
Start: 2023-06-06

## 2023-06-06 ASSESSMENT — ACTIVITIES OF DAILY LIVING (ADL): CURRENT_FUNCTION: NO ASSISTANCE NEEDED

## 2023-06-06 ASSESSMENT — PAIN SCALES - GENERAL: PAINLEVEL: NO PAIN (0)

## 2023-06-06 NOTE — PROGRESS NOTES
"SUBJECTIVE:   Pronounced Byron is a 73 year old who presents for Preventive Visit.       View : No data to display.              Are you in the first 12 months of your Medicare coverage?  No. Started in 2013.     Healthy Habits:     In general, how would you rate your overall health?  Good    Frequency of exercise:  6-7 days/week    Duration of exercise:  Greater than 60 minutes    Do you usually eat at least 4 servings of fruit and vegetables a day, include whole grains    & fiber and avoid regularly eating high fat or \"junk\" foods?  Yes    Taking medications regularly:  Yes    Medication side effects:  None    Ability to successfully perform activities of daily living:  No assistance needed    Home Safety:  No safety concerns identified    Hearing Impairment:  No hearing concerns    In the past 6 months, have you been bothered by leaking of urine?  No    In general, how would you rate your overall mental or emotional health?  Good      PHQ-2 Total Score: 0    Additional concerns today:  No      Wellness Visit Notes:    -Last colon cancer screening done via FIT testing on 6/03/19 (impression: negative). Order placed for updated FIT testing.   -Last PSA lab work performed 10/13/06 (value of 0.15 micrograms/L). Discussed risks/benefits of PSA testing today in detail, including possibility of false positive results and/or possibility of biopsy recommended as next step. Pt would like to hold off on PSA screening.   -Immunizations: Pt is due for COVID vaccine and due for Shingles vaccine, however advised pt complete Shingles vaccine at local pharmacy given Medicare insurance. COVID bivalent ordered/given today. Pt agreed to shingles at local pharmacy.   -ACP: Discussed medical directive/ACP in detail with patient today and provided our Honoring Choices documents. Advised pt return a copy to clinic once completed; pt expressed understanding.   -Education: Pt denies any safety/hearing/ADL/incontinence concerns. " Burow's Advancement Flap Text: The defect edges were debeveled with a #15 scalpel blade.  Given the location of the defect and the proximity to free margins a Burow's advancement flap was deemed most appropriate.  Using a sterile surgical marker, the appropriate advancement flap was drawn incorporating the defect and placing the expected incisions within the relaxed skin tension lines where possible.    The area thus outlined was incised deep to adipose tissue with a #15 scalpel blade.  The skin margins were undermined to an appropriate distance in all directions utilizing iris scissors.   -Refills pended for synthroid-- reviewed, this is managed by vamshi marte.  -Pt due for AAA screening and lung cancer screening, per HM. Smoking history updated/confirmed today.   Pt declines lung cancer screening, and states AAA screening has been done.      -Pt notes an issue with insomnia  -Pt also notes gum irritation, and is wondering if abx would help      Insomnia-   Coffee lover, stopped caffeine at noon, for ~6 months.  Didn't help sx's.  Melatonin 20mg/d- thinks it's helping.  Sx's- no trouble falling asleep, asleep within 10 minutes.  ~1-3 hrs later, wakes up, takes  minutes to get back to sleep.  Going on for 25 yrs.  Sober.  With the melatonin 20mg/d, getting up 1nt/wk, before 2-3/wk.    Gum irritation-   Has root canal txt in 1/23, with endodontist.  Spread down to his gum, leaked prior, discharge which gave him some relief.  After procedure, still had some discomfort.  Last week, pressure in gum area.     Stomach sx's-   Sometimes has hunger pains?  Has breakfast, but no lunch.  Eats ~9:30am and 5:30pm, sometimes around 4pm, stomach hurts.  Then has popcorn prior to bed, couple handfuls 2 hrs after dinner.  Sat eves, ice cream.  Has some peanuts or water, and that helps.  Sometimes a night when he wakes up at night, feels acid reflux or heartburn, sometimes gas.      Have you ever done Advance Care Planning? (For example, a Health Directive, POLST, or a discussion with a medical provider or your loved ones about your wishes): Yes, patient states has an Advance Care Planning document and will bring a copy to the clinic.      Fall risk  Fallen 2 or more times in the past year?: Yes  Any fall with injury in the past year?: No      Cognitive Screening   1) Repeat 3 items (Leader, Season, Table)    2) Clock draw: NORMAL  3) 3 item recall: Recalls 3 objects  Results: 3 items recalled: COGNITIVE IMPAIRMENT LESS LIKELY    Mini-CogTM Copyright [employer]. Licensed by the author for use in [employer]  Tissue Cultured Epidermal Autograft Text: The defect edges were debeveled with a #15 scalpel blade.  Given the location of the defect, shape of the defect and the proximity to free margins a tissue cultured epidermal autograft was deemed most appropriate.  The graft was then trimmed to fit the size of the defect.  The graft was then placed in the primary defect and oriented appropriately. Anesthesia Volume In Cc: [facility]; reprinted with permission (soob@.[facility]). All rights reserved.      Do you have sleep apnea, excessive snoring or daytime drowsiness?: no    Reviewed and updated as needed this visit by clinical staff   Tobacco  Allergies  Meds  Problems  Med Hx  Surg Hx  Fam Hx          Reviewed and updated as needed this visit by Provider   Tobacco  Allergies  Meds  Problems  Med Hx  Surg Hx  Fam Hx         Social History     Tobacco Use     Smoking status: Former     Types: Cigars     Quit date: 2018     Years since quittin.4     Smokeless tobacco: Never     Tobacco comments:     - was down to cigars twice a week   Vaping Use     Vaping status: Never Used   Substance Use Topics     Alcohol use: No             2023    11:14 AM   Alcohol Use   Prescreen: >3 drinks/day or >7 drinks/week? No          View : No data to display.              Do you have a current opioid prescription? No  Do you use any other controlled substances or medications that are not prescribed by a provider? None        Current providers sharing in care for this patient include:   Patient Care Team:  Petra Reynolds MD as PCP - General (Family Practice)    The following health maintenance items are reviewed in Epic and correct as of today:  Health Maintenance   Topic Date Due     ANNUAL REVIEW OF HM ORDERS  Never done     HEPATITIS C SCREENING  Never done     LUNG CANCER SCREENING  Never done     AORTIC ANEURYSM SCREENING (SYSTEM ASSIGNED)  Never done     ZOSTER IMMUNIZATION (2 of 3) 2013     LIPID  2015     COLORECTAL CANCER SCREENING  2020     TSH W/FREE T4 REFLEX  2021     MEDICARE ANNUAL WELLNESS VISIT  2024     FALL RISK ASSESSMENT  2024     DTAP/TDAP/TD IMMUNIZATION (3 - Td or Tdap) 10/19/2027     ADVANCE CARE PLANNING  2028     PHQ-2 (once per calendar year)  Completed     INFLUENZA VACCINE  Completed     Pneumococcal Vaccine: 65+ Years  Completed      COVID-19 Vaccine  Completed     IPV IMMUNIZATION  Aged Out     MENINGITIS IMMUNIZATION  Aged Out         Lab work is in process  Labs reviewed in EPIC  BP Readings from Last 3 Encounters:   23 126/79   20 127/79   19 136/82    Wt Readings from Last 3 Encounters:   23 80.2 kg (176 lb 12.8 oz)   20 87.1 kg (192 lb 1 oz)   19 80.8 kg (178 lb 1 oz)                  Patient Active Problem List   Diagnosis     Coronary artery disease involving native coronary artery of native heart without angina pectoris     Hyperlipidemia LDL goal <100     Smoking     PAD (peripheral artery disease) (H)     Urticaria     History of nephrolithiasis     Primary insomnia     S/P femoral-popliteal bypass surgery     Acute deep vein thrombosis (DVT) of femoral vein of right lower extremity (H)     Anticoagulation management encounter     Urinary retention     Scrotal edema     Essential hypertension     Past Surgical History:   Procedure Laterality Date     AS REVASCULARIZE FEM/POP ARTERY, ANGIOPLASTY/STENT      multiple in '15 and      CYSTOSCOPY      multiple      HC URETERAL STENT REPOSITIONING Left 2016       Social History     Tobacco Use     Smoking status: Former     Types: Cigars     Quit date: 2018     Years since quittin.5     Smokeless tobacco: Never     Tobacco comments:     - was down to cigars twice a week   Substance Use Topics     Alcohol use: No     Family History   Problem Relation Age of Onset     Ovarian Cancer Mother          at 63     Diabetes Father      Coronary Artery Disease Father          at 65 of heart issues     Cancer Maternal Grandfather         oral cancer, chewed tobacco         Current Outpatient Medications   Medication Sig Dispense Refill     aspirin EC 81 MG EC tablet Take 81 mg by mouth daily        levothyroxine (SYNTHROID/LEVOTHROID) 50 MCG tablet Take 1 tablet (50 mcg) by mouth daily 60 tablet 0     metoprolol tartrate (LOPRESSOR) 25  Show Asc Variables: Yes "MG tablet Take 12.5 mg by mouth 2 times daily       simvastatin (ZOCOR) 40 MG tablet Take 40 mg by mouth At Bedtime        Allergies   Allergen Reactions     No Known Allergies      Recent Labs   Lab Test 06/06/23  1213 01/21/20  1521 01/16/19  1525 12/31/18  0000   HDL 46  --   --   --    ALT 21  --   --   --    CR 0.99  --  0.91 0.93   GFRESTIMATED 79  --  85 62   GFRESTBLACK  --   --  >90 72   POTASSIUM 4.5  --  4.6 4.1   TSH  --  48.22*  --   --           Review of Systems  Constitutional, HEENT, cardiovascular, pulmonary, gi and gu systems are negative, except as otherwise noted.    OBJECTIVE:   /79   Pulse 71   Temp 97.2  F (36.2  C) (Temporal)   Resp 18   Ht 1.842 m (6' 0.5\")   Wt 80.2 kg (176 lb 12.8 oz)   SpO2 95%   BMI 23.65 kg/m   Estimated body mass index is 23.65 kg/m  as calculated from the following:    Height as of this encounter: 1.842 m (6' 0.5\").    Weight as of this encounter: 80.2 kg (176 lb 12.8 oz).  Physical Exam  GENERAL: healthy, alert and no distress  EYES: Eyes grossly normal to inspection, PERRL and conjunctivae and sclerae normal  HENT: ear canals and TM's normal, nose and mouth without ulcers or lesions  NECK: no adenopathy, no asymmetry, masses, or scars and thyroid normal to palpation  RESP: lungs clear to auscultation - no rales, rhonchi or wheezes  CV: regular rate and rhythm, normal S1 S2, no S3 or S4, no murmur, click or rub, no peripheral edema and peripheral pulses strong  ABDOMEN: soft, nontender, no hepatosplenomegaly, no masses and bowel sounds normal  MS: no gross musculoskeletal defects noted, no edema  SKIN: no suspicious lesions or rashes  NEURO: Normal strength and tone, mentation intact and speech normal  PSYCH: mentation appears normal, affect normal/bright    Diagnostic Test Results:  Labs reviewed in Epic    ASSESSMENT / PLAN:       ICD-10-CM    1. Encounter for Medicare annual wellness exam  Z00.00 PRIMARY CARE FOLLOW-UP SCHEDULING     PRIMARY CARE " Bi-Rhombic Flap Text: The defect edges were debeveled with a #15 scalpel blade.  Given the location of the defect and the proximity to free margins a bi-rhombic flap was deemed most appropriate.  Using a sterile surgical marker, an appropriate rhombic flap was drawn incorporating the defect. The area thus outlined was incised deep to adipose tissue with a #15 scalpel blade.  The skin margins were undermined to an appropriate distance in all directions utilizing iris scissors. Complex Repair And Double M Plasty Text: The defect edges were debeveled with a #15 scalpel blade.  The primary defect was closed partially with a complex linear closure.  Given the location of the remaining defect, shape of the defect and the proximity to free margins a double M plasty was deemed most appropriate for complete closure of the defect.  Using a sterile surgical marker, an appropriate advancement flap was drawn incorporating the defect and placing the expected incisions within the relaxed skin tension lines where possible.    The area thus outlined was incised deep to adipose tissue with a #15 scalpel blade.  The skin margins were undermined to an appropriate distance in all directions utilizing iris scissors. FOLLOW-UP SCHEDULING      2. Other insomnia  G47.09       3. Heartburn  R12       4. Anemia, unspecified type  D64.9 Comprehensive metabolic panel (BMP + Alb, Alk Phos, ALT, AST, Total. Bili, TP)     CBC with platelets     Vitamin B12     Folate     Ferritin     Comprehensive metabolic panel (BMP + Alb, Alk Phos, ALT, AST, Total. Bili, TP)     CBC with platelets     Vitamin B12     Folate     Ferritin      5. Gum symptoms  R19.8       6. Coronary artery disease involving native coronary artery of native heart without angina pectoris  I25.10 Comprehensive metabolic panel (BMP + Alb, Alk Phos, ALT, AST, Total. Bili, TP)     Cholesterol     HDL cholesterol     Comprehensive metabolic panel (BMP + Alb, Alk Phos, ALT, AST, Total. Bili, TP)     Cholesterol     HDL cholesterol      7. PAD (peripheral artery disease) (H)  I73.9 Comprehensive metabolic panel (BMP + Alb, Alk Phos, ALT, AST, Total. Bili, TP)     Cholesterol     HDL cholesterol     Comprehensive metabolic panel (BMP + Alb, Alk Phos, ALT, AST, Total. Bili, TP)     Cholesterol     HDL cholesterol      8. Hypothyroidism due to Hashimoto's thyroiditis  E03.8     E06.3       9. Need for shingles vaccine  Z23       10. Need for hepatitis C screening test  Z11.59       11. Screen for colon cancer  Z12.11 Fecal colorectal cancer screen FIT - Future (S+30)     Fecal colorectal cancer screen FIT - Future (S+30)          RN Wellness Visit Notes, MD bold notes:  -Last colon cancer screening done via FIT testing on 6/03/19 (impression: negative). Order placed for updated FIT testing.   -Last PSA lab work performed 10/13/06 (value of 0.15 micrograms/L). Discussed risks/benefits of PSA testing today in detail, including possibility of false positive results and/or possibility of biopsy recommended as next step. Pt would like to hold off on PSA screening.   -Immunizations: Pt is due for COVID vaccine and due for Shingles vaccine, however advised pt complete Shingles vaccine at  [place] [facility] Repair Preamble Text (Leave Blank If You Do Not Want): Extensive wide undermining was performed. local pharmacy given Medicare insurance. COVID bivalent ordered/given today. Pt agreed to shingles at local pharmacy.   -ACP: Discussed medical directive/ACP in detail with patient today and provided our Honoring Choices documents. Advised pt return a copy to clinic once completed; pt expressed understanding.   -Education: Pt denies any safety/hearing/ADL/incontinence concerns.   -Refills pended for synthroid-- reviewed, this is managed by vamshi marte.  -Pt due for AAA screening and lung cancer screening, per . Smoking history updated/confirmed today. Pt declines lung cancer screening, and states AAA screening has been done.  -----------------------------      Insomnia-   Issue for 20-25 yrs, falls asleep okay, but wakes 1-3 hrs later, and is up for  minutes.  More bothersome last ~6 months.  Cutting back on caffeine to before noon did not seem to help.  Taking melatonin 20mg at night helped lessen frequency of insomnia (from 2-3x/wk to 1x/wk). Discussed that dose is higher than recommended.  Does note that sometimes reflux sx's wake him up.  Rec trial of tums at bedtime, or pepcid.  Could try CBD (would then not use melatonin) as well.  Either way, if not using other meds, would decrease melatonin to at least 10mg/d.    Heartburn- sx's prior to dinner after skipping lunch at times.  Rec snacking or eating to avoid.  Tums as above at bedtime, or famotidine.    Anemia- h/o anemia, last hgb in chart in 1/20, in 11's. MCV ~100.    Folate okay, B12 low end of normal - 200s.  He had taken iron but hasn't in awhile.  Will recheck CBC, B12, folate and ferritin today.    CAD/PAD- continues yearly follow-up with cardiology.  Reports he doesn't do labs there, though, so hasn't had labs done in awhile.  Will check CMP and non-fasting lipids (chol and HDL) today.    Thyroid- continues yearly follow-up with [facility].    Gum irritation- exam okay today, rec follow-up with dental providers.      Patient has been advised  V-Y Plasty Text: The defect edges were debeveled with a #15 scalpel blade.  Given the location of the defect, shape of the defect and the proximity to free margins an V-Y advancement flap was deemed most appropriate.  Using a sterile surgical marker, an appropriate advancement flap was drawn incorporating the defect and placing the expected incisions within the relaxed skin tension lines where possible.    The area thus outlined was incised deep to adipose tissue with a #15 scalpel blade.  The skin margins were undermined to an appropriate distance in all directions utilizing iris scissors. of split billing requirements and indicates understanding: Yes      COUNSELING:  Reviewed preventive health counseling, as reflected in patient instructions  Special attention given to:       Consider AAA screening for ages 65-75 and smoking history       Regular exercise       Healthy diet/nutrition       Dental care       Hepatitis C screening       Consider lung cancer screening for ages 55-80 years (77 for Medicare) and 20 pack-year smoking history        Colon cancer screening       Prostate cancer screening        He reports that he quit smoking about 4 years ago. His smoking use included cigars. He has never used smokeless tobacco.      Appropriate preventive services were discussed with this patient, including applicable screening as appropriate for cardiovascular disease, diabetes, osteopenia/osteoporosis, and glaucoma.  As appropriate for age/gender, discussed screening for colorectal cancer, prostate cancer, breast cancer, and cervical cancer. Checklist reviewing preventive services available has been given to the patient.    Reviewed patients plan of care and provided an AVS. The Basic Care Plan (routine screening as documented in Health Maintenance) for [place] meets the Care Plan requirement. This Care Plan has been established and reviewed with the Patient.      Petra Reynolds MD  [facility]     Identified Health Risks:    I have reviewed Opioid Use Disorder and Substance Use Disorder risk factors and made any needed referrals.       He is at risk for falling and has been provided with information to reduce the risk of falling at home.   

## 2023-06-06 NOTE — LETTER
June 22, 2023      Chepe Webster  [address]  [address]  [facility] [id number]    Dear ,    Here are your lab results from your recent visit...     -Your FIT test (the screening test for colon cancer by testing for blood in your stool), was negative.     -Your total cholesterol looks good at 175.  Your HDL (the good cholesterol) looks fine at 46.   -Your CMP (which includes electrolyte levels, blood sugar levels, and kidney and liver function tests) looks good. The slightly elevated glucose is fine since you were not fasting for the lab draw.   -Your CBC labs (which includes blood labs looking for signs of infection or anemia) looks great now.  Your ferritin (sign of iron stores) and folate levels are normal as well.   -Your Vitamin B12 level is on the low end of normal, so I would consider supplementing with Vitamin B12 1000mcg/day.      Resulted Orders   Fecal colorectal cancer screen FIT - Future (S+30)   Result Value Ref Range    Occult Blood Screen FIT Negative Negative   Comprehensive metabolic panel (BMP + Alb, Alk Phos, ALT, AST, Total. Bili, TP)   Result Value Ref Range    Sodium 136 136 - 145 mmol/L    Potassium 4.5 3.4 - 5.3 mmol/L    Chloride 102 98 - 107 mmol/L    Carbon Dioxide (CO2) 22 22 - 29 mmol/L    Anion Gap 12 7 - 15 mmol/L    Urea Nitrogen 16.6 8.0 - 23.0 mg/dL    Creatinine 0.99 0.67 - 1.17 mg/dL    Calcium 9.6 8.8 - 10.2 mg/dL    Glucose 110 (H) 70 - 99 mg/dL    Alkaline Phosphatase 57 40 - 129 U/L    AST 25 10 - 50 U/L    ALT 21 10 - 50 U/L    Protein Total 7.6 6.4 - 8.3 g/dL    Albumin 4.3 3.5 - 5.2 g/dL    Bilirubin Total 0.4 <=1.2 mg/dL    GFR Estimate 79 >60 mL/min/1.73m2      Comment:      eGFR calculated using 2021 CKD-EPI equation.   CBC with platelets   Result Value Ref Range    WBC Count 5.9 4.0 - 11.0 10e3/uL    RBC Count 4.93 4.40 - 5.90 10e6/uL    Hemoglobin 15.2 13.3 - 17.7 g/dL    Hematocrit 45.4 40.0 - 53.0 %    MCV 92 78 - 100 fL    MCH 30.8 26.5 - 33.0  pg    MCHC 33.5 31.5 - 36.5 g/dL    RDW 12.7 10.0 - 15.0 %    Platelet Count 224 150 - 450 10e3/uL   Cholesterol   Result Value Ref Range    Cholesterol 175 <200 mg/dL      Comment:      Age 2-19 years  Desirable: <170 mg/dL  Borderline high:  170-199 mg/dl  High:            >199 mg/dl    Age 20 years and older  Desirable: <200 mg/dL   HDL cholesterol   Result Value Ref Range    Direct Measure HDL 46 >=40 mg/dL      Comment:      2-19 years:       Greater than or equal to 45 mg/dL   Low: Less than 40 mg/dL   Borderline low: 40-44 mg/dL     20 years and older:   Female: Greater than or equal to 50 mg/dL   Male:   Greater than or equal to 40 mg/dL   Vitamin B12   Result Value Ref Range    Vitamin B12 263 232 - 1,245 pg/mL   Folate   Result Value Ref Range    Folic Acid 9.9 4.6 - 34.8 ng/mL   Ferritin   Result Value Ref Range    Ferritin 47 31 - 409 ng/mL       If you have any questions or concerns, please call the clinic at the number listed above.       Sincerely,      Petra Reynolds MD

## 2023-06-06 NOTE — PATIENT INSTRUCTIONS
Patient Education   Personalized Prevention Plan  You are due for the preventive services outlined below.  Your care team is available to assist you in scheduling these services.  If you have already completed any of these items, please share that information with your care team to update in your medical record.  Health Maintenance Due   Topic Date Due     ANNUAL REVIEW OF HM ORDERS  Never done     Hepatitis C Screening  Never done     LUNG CANCER SCREENING  Never done     AORTIC ANEURYSM SCREENING (SYSTEM ASSIGNED)  Never done     Zoster (Shingles) Vaccine (2 of 3) 05/06/2013     Cholesterol Lab  07/06/2015     Colorectal Cancer Screening  06/03/2020     Thyroid Function Lab  01/21/2021     COVID-19 Vaccine (6 - Pfizer series) 02/13/2023       Preventing Falls at Home  A person can fall for many reasons. Older adults may fall because reaction time slows down as we age. Your muscles and joints may get stiff, weak, or less flexible because of illness, medicines, or a physical condition.   Other health problems that make falls more likely include:     Arthritis    Dizziness or lightheadedness when you stand up (orthostatic hypotension)    History of a stroke    Dizziness    Anemia    Certain medicines taken for mental illness or to control blood pressure.    Problems with balance or gait    Bladder or urinary problems    History of falling    Changes in vision (vision impairment)    Changes in thinking skills and memory (cognitive impairment)  Injuries from a fall can include serious injuries such as broken bones, dislocated joints, internal bleeding and cuts. Injuries like these can limit your independence.   Prevention tips  To help prevent falls and fall-related injuries, follow the tips below.    Floors  To make floors safer:     Put nonskid pads under area rugs.    Remove small rugs.    Replace worn floor coverings.    Tack carpets firmly to each step on carpeted stairs. Put nonskid strips on the edges of  uncarpeted stairs.    Keep floors and stairs free of clutter and cords.    Arrange furniture so there are clear pathways.    Clean up any spills right away.  Bathrooms    To make bathrooms safer:     Install grab bars in the tub or shower.    Apply nonskid strips or put a nonskid rubber mat in the tub or shower.    Sit on a bath chair to bathe.    Use bathmats with nonskid backing.  Lighting  To improve visibility in your home:      Keep a flashlight in each room. Or put a lamp next to the bed within easy reach.    Put nightlights in the bedrooms, hallways, kitchen, and bathrooms.    Make sure all stairways have good lighting.    Take your time when going up and down stairs.    Put handrails on both sides of stairs and in walkways for more support. To prevent injury to your wrist or arm, don t use handrails to pull yourself up.    Install grab bars to pull yourself up.    Move or rearrange items that you use often. This will make them easier to find or reach.    Look at your home to find any safety hazards. Especially look at doorways, walkways, and the driveway. Remove or repair any safety problems that you find.  Other changes to make    Look around to find any safety hazards. Look closely at doorways, walkways, and the driveway. Remove or repair any safety problems that you find.    Wear shoes that fit well.    Take your time when going up and down stairs.    Put handrails on both sides of stairs and in walkways for more support. To prevent injury to your wrist or arm, don t use handrails to pull yourself up.    Install grab bars wherever needed to pull yourself up.    Arrange items that you use often. This will make them easier to find or reach.    [employer] last reviewed this educational content on 3/1/2020    3532-6530 [employer]. All rights reserved. This information is not intended as a substitute for professional medical care. Always follow your healthcare professional's instructions.

## 2023-06-10 PROCEDURE — 82274 ASSAY TEST FOR BLOOD FECAL: CPT | Performed by: FAMILY MEDICINE

## 2023-06-12 LAB — HEMOCCULT STL QL IA: NEGATIVE

## 2023-06-12 NOTE — RESULT ENCOUNTER NOTE
Please send letter below with copy of results.  Thanks! CW    Here are your lab results from your recent visit...    -Your FIT test (the screening test for colon cancer by testing for blood in your stool), was negative.    -Your total cholesterol looks good at 175.  Your HDL (the good cholesterol) looks fine at 46.  -Your CMP (which includes electrolyte levels, blood sugar levels, and kidney and liver function tests) looks good. The slightly elevated glucose is fine since you were not fasting for the lab draw.  -Your CBC labs (which includes blood labs looking for signs of infection or anemia) looks great now.  Your ferritin (sign of iron stores) and folate levels are normal as well.  -Your Vitamin B12 level is on the low end of normal, so I would consider supplementing with Vitamin B12 1000mcg/day.    Please let me know if you have any questions.  Best,   Gabriel Reynolds MD